# Patient Record
Sex: MALE | Race: WHITE | NOT HISPANIC OR LATINO | Employment: OTHER | URBAN - METROPOLITAN AREA
[De-identification: names, ages, dates, MRNs, and addresses within clinical notes are randomized per-mention and may not be internally consistent; named-entity substitution may affect disease eponyms.]

---

## 2017-04-18 ENCOUNTER — TELEPHONE (OUTPATIENT)
Dept: PULMONOLOGY | Facility: HOSPICE | Age: 67
End: 2017-04-18

## 2017-04-18 DIAGNOSIS — G47.33 OBSTRUCTIVE SLEEP APNEA: ICD-10-CM

## 2017-05-25 VITALS
BODY MASS INDEX: 44.57 KG/M2 | HEIGHT: 60 IN | DIASTOLIC BLOOD PRESSURE: 62 MMHG | TEMPERATURE: 97.9 F | SYSTOLIC BLOOD PRESSURE: 120 MMHG | WEIGHT: 227 LBS | HEART RATE: 70 BPM | RESPIRATION RATE: 16 BRPM

## 2017-05-25 RX ORDER — HYDROCHLOROTHIAZIDE 12.5 MG/1
12.5 TABLET ORAL DAILY
COMMUNITY
End: 2018-05-11

## 2017-05-25 RX ORDER — AMLODIPINE BESYLATE 5 MG/1
5 TABLET ORAL DAILY
COMMUNITY
End: 2018-05-11

## 2017-05-25 RX ORDER — TRAZODONE HYDROCHLORIDE 50 MG/1
50-100 TABLET ORAL NIGHTLY PRN
COMMUNITY
End: 2017-06-14

## 2017-06-14 ENCOUNTER — SLEEP CENTER VISIT (OUTPATIENT)
Dept: SLEEP MEDICINE | Facility: MEDICAL CENTER | Age: 67
End: 2017-06-14
Payer: COMMERCIAL

## 2017-06-14 VITALS
RESPIRATION RATE: 16 BRPM | SYSTOLIC BLOOD PRESSURE: 112 MMHG | HEART RATE: 69 BPM | WEIGHT: 227 LBS | TEMPERATURE: 97.7 F | BODY MASS INDEX: 33.62 KG/M2 | OXYGEN SATURATION: 92 % | DIASTOLIC BLOOD PRESSURE: 70 MMHG | HEIGHT: 69 IN

## 2017-06-14 DIAGNOSIS — G47.00 INSOMNIA, UNSPECIFIED TYPE: ICD-10-CM

## 2017-06-14 DIAGNOSIS — G47.33 OSA (OBSTRUCTIVE SLEEP APNEA): ICD-10-CM

## 2017-06-14 PROCEDURE — 99213 OFFICE O/P EST LOW 20 MIN: CPT | Performed by: NURSE PRACTITIONER

## 2017-06-14 RX ORDER — IRBESARTAN 300 MG/1
TABLET ORAL
COMMUNITY
Start: 2017-05-27 | End: 2018-05-11

## 2017-06-14 RX ORDER — TRIAMTERENE AND HYDROCHLOROTHIAZIDE 37.5; 25 MG/1; MG/1
TABLET ORAL
COMMUNITY
Start: 2017-04-10 | End: 2018-05-11

## 2017-06-14 RX ORDER — TRAZODONE HYDROCHLORIDE 100 MG/1
100 TABLET ORAL NIGHTLY PRN
Qty: 30 TAB | Refills: 3 | Status: SHIPPED
Start: 2017-06-14 | End: 2017-09-08 | Stop reason: SDUPTHER

## 2017-06-14 NOTE — PATIENT INSTRUCTIONS
1) Continue CPAP at 8cmH20  2) Clean mask and supplies weekly and change them as insurance allows  3) Vaccines: Up to date Pneumovax 23  4) Return in about 1 year (around 6/14/2018) for Compliance, review of symptoms, if not sooner, follow up with MARISSA Caban.

## 2017-06-14 NOTE — PROGRESS NOTES
CC:  Here for f/u sleep issues as listed below    HPI:   Channing presents today for follow up obstructive sleep apnea. He is [employer]. PMH of HTN, Autoimmune hepatisis, pacer for second degree AV block. PSG from 1/2016 indicated an AHI of 51 and low oxygen of 80%.  Currently he is being treated with CPAP @ 8cmH20.  Compliance download from the dates 5/15/2017 - 6/13/2017 indicates he is wearing the device 100% for an avg of 8 hours and 23 minutes per night with a reduced AHI of 0.7.  He does tolerate pressure and mask well.  He wakes up refreshed and is less tired throughout the day. They deny morning H/A. He sleeps better overall injury and more frequently now on treatment. He tried Trazodone for insomnia a few occasions with benefit. He will continue to clean supplies weekly and change them as insurance allows.       Patient Active Problem List    Diagnosis Date Noted   • Insomnia 06/14/2017   • PRINCESS (obstructive sleep apnea) 06/14/2017   • CKD (chronic kidney disease) stage 3, GFR 30-59 ml/min 08/24/2016   • Right knee pain 04/29/2016   • Atrioventricular block 12/05/2013   • Renal insufficiency 08/01/2011   • HTN (hypertension) 05/24/2011       Past Medical History   Diagnosis Date   • Hepatitis C    • Jaundice    • Sleep apnea    • Snoring    • Arthritis      knee/fingers bilat   • Hypertension        Past Surgical History   Procedure Laterality Date   • Recovery  12/5/2013     Performed by Cath-Recovery Surgery at SURGERY SAME DAY [facility]   • Appendectomy  1996   • Pacemaker insertion  2013   • Knee arthroscopy Right 4/29/2016     Procedure: KNEE ARTHROSCOPY;  Surgeon: Yesenia Grigsby M.D.;  Location: [facility];  Service:    • Chondroplasty Right 4/29/2016     Procedure: CHONDROPLASTY;  Surgeon: Yesenia Grigsby M.D.;  Location: [facility];  Service:    • Meniscectomy Right 4/29/2016     Procedure: MENISCECTOMY - PARTIAL LATERAL;  Surgeon: Yesenia  SHEA Grigsby;  Location: SURGERY [facility];  Service:        Family History   Problem Relation Age of Onset   • Heart Disease Father    • Breast Cancer Mother    • Cancer Mother        Social History     Social History   • Marital Status:      Spouse Name: N/A   • Number of Children: N/A   • Years of Education: N/A     Occupational History   • Not on file.     Social History Main Topics   • Smoking status: Former Smoker -- 1.50 packs/day for 35 years     Types: Cigarettes     Quit date: 04/25/1981   • Smokeless tobacco: Never Used   • Alcohol Use: 0.0 oz/week     0 Standard drinks or equivalent per week      Comment: 2 per month   • Drug Use: No   • Sexual Activity: Not on file     Other Topics Concern   • Not on file     Social History Narrative       Current Outpatient Prescriptions   Medication Sig Dispense Refill   • irbesartan (AVAPRO) 300 MG Tab      • triamterene-hctz (MAXZIDE-25/DYAZIDE) 37.5-25 MG Tab      • trazodone (DESYREL) 100 MG Tab Take 1 Tab by mouth at bedtime as needed for Sleep. 30 Tab 3   • carvedilol (COREG) 6.25 MG Tab Take 6.25 mg by mouth 2 times a day, with meals.     • calcium carbonate (TUMS) 500 MG Chew Tab Take 500 mg by mouth every day.     • hydrochlorothiazide (HYDRODIURIL) 12.5 MG tablet Take 12.5 mg by mouth every day.     • amlodipine (NORVASC) 5 MG Tab Take 5 mg by mouth every day.     • triamterene/hctz (DYAZIDE) 37.5-25 MG Cap Take 1 Cap by mouth every morning.     • irbesartan (AVAPRO) 150 MG Tab Take 150 mg by mouth every evening.       No current facility-administered medications for this visit.          Allergies: Ancef      ROS   Gen: Denies fever, chills, unintentional weight loss, fatigue  Resp:Denies Dyspnea  CV: Denies chest pain, chest tightness  Sleep:Denies morning headache, insomnia, daytime somnolence, snoring, gasping for air, apnea  Neuro: Denies frequent headaches, weakness, dizziness  See HPI.  All other systems reviewed and  "negative        Vital signs for this encounter:  Filed Vitals:    06/14/17 0817   Height: 1.753 m (5' 9\")   Weight: 102.967 kg (227 lb)   Weight % change since last entry.: 0 %   BP: 112/70   Pulse: 69   BMI (Calculated): 33.52   Resp: 16   Temp: 36.5 °C (97.7 °F)   O2 sat % room air: 92 %                   Physical Exam:   Gen:         Alert and oriented, No apparent distress.   Neck:        No Lymphadenopathy.  Lungs:     Clear to auscultation bilaterally.    CV:          Regular rate and rhythm. No murmurs, rubs or gallops.   Abd:         Soft non tender, non distended.            Ext:          No clubbing, cyanosis, edema.    Assessment   1. Insomnia, unspecified type  trazodone (DESYREL) 100 MG Tab    DME MASK AND SUPPLIES   2. PRINCESS (obstructive sleep apnea)         PLAN:   Patient Instructions   1) Continue CPAP at 8cmH20  2) Clean mask and supplies weekly and change them as insurance allows  3) Vaccines: Up to date Pneumovax 23  4) Return in about 1 year (around 6/14/2018) for Compliance, review of symptoms, if not sooner, follow up with MARISSA Caban.  5) Refill on Trazodone          "

## 2017-06-14 NOTE — MR AVS SNAPSHOT
"        Channing Grande   2017 8:20 AM   Sleep Center Visit   MRN: [id number]    Department:  [facility]   Dept Phone:  [phone]    Description:  Male : 1950   Provider:  JAMIN Canada           Reason for Visit     Apnea Last seen 3/9/16      Allergies as of 2017     Allergen Noted Reactions    Ancef [Cefazolin Sodium] 2013       N/V    Per patient No know allergies       You were diagnosed with     Insomnia, unspecified type   [3031563]       PRINCESS (obstructive sleep apnea)   [033278]         Vital Signs     Blood Pressure Pulse Temperature Respirations Height Weight    112/70 mmHg 69 36.5 °C (97.7 °F) 16 1.753 m (5' 9\") 102.967 kg (227 lb)    Body Mass Index Oxygen Saturation Smoking Status             33.51 kg/m2 92% Former Smoker         Basic Information     Date Of Birth Sex Race Ethnicity Preferred Language    1950 Male White Non- English      Problem List              ICD-10-CM Priority Class Noted - Resolved    HTN (hypertension) I10   2011 - Present    Renal insufficiency N28.9   2011 - Present    Atrioventricular block I44.30   2013 - Present    Right knee pain M25.561   2016 - Present    CKD (chronic kidney disease) stage 3, GFR 30-59 ml/min N18.3   2016 - Present    Insomnia G47.00   2017 - Present    PRINCESS (obstructive sleep apnea) G47.33   2017 - Present      Health Maintenance        Date Due Completion Dates    IMM DTaP/Tdap/Td Vaccine (1 - Tdap) 1967 ---    COLONOSCOPY 1998 ---    IMM ZOSTER VACCINE 2008 ---    IMM PNEUMOCOCCAL 65+ (ADULT) LOW/MEDIUM RISK SERIES (2 of 2 - PCV13) 2014            Current Immunizations     Influenza TIV (IM) 2013 10:47 AM    Pneumococcal polysaccharide vaccine (PPSV-23) 2013 10:47 AM      Below and/or attached are the medications your provider expects you to take. Review all of your home medications and newly ordered " medications with your provider and/or pharmacist. Follow medication instructions as directed by your provider and/or pharmacist. Please keep your medication list with you and share with your provider. Update the information when medications are discontinued, doses are changed, or new medications (including over-the-counter products) are added; and carry medication information at all times in the event of emergency situations     Allergies:  ANCEF - (reactions not documented)               Medications  Valid as of: June 14, 2017 -  9:04 AM    Generic Name Brand Name Tablet Size Instructions for use    AmLODIPine Besylate (Tab) NORVASC 5 MG Take 5 mg by mouth every day.        Calcium Carbonate Antacid (Chew Tab) TUMS 500 MG Take 500 mg by mouth every day.        Carvedilol (Tab) COREG 6.25 MG Take 6.25 mg by mouth 2 times a day, with meals.        HydroCHLOROthiazide (Tab) HYDRODIURIL 12.5 MG Take 12.5 mg by mouth every day.        Irbesartan (Tab) AVAPRO 150 MG Take 150 mg by mouth every evening.        Irbesartan (Tab) AVAPRO 300 MG         TraZODone HCl (Tab) DESYREL 100 MG Take 1 Tab by mouth at bedtime as needed for Sleep.        Triamterene-HCTZ (Cap) MAXZIDE-25/DYAZIDE 37.5-25 MG Take 1 Cap by mouth every morning.        Triamterene-HCTZ (Tab) MAXZIDE-25/DYAZIDE 37.5-25 MG         .                 Medicines prescribed today were sent to:     [id number] - [place], [place] - [address]    [address] [facility] [place] [address]    Phone: [phone] Fax: [phone]    Open 24 Hours?: No    [facility]    [address] [place] [address]    Phone: [phone] Fax: [phone]      Medication refill instructions:       If your prescription bottle indicates you have medication refills left, it is not necessary to call your provider’s office. Please contact your pharmacy and they will refill your medication.    If your prescription bottle indicates you do not have any refills left, you  may request refills at any time through one of the following ways: The online [employer] system (except Urgent Care), by calling your provider’s office, or by asking your pharmacy to contact your provider’s office with a refill request. Medication refills are processed only during regular business hours and may not be available until the next business day. Your provider may request additional information or to have a follow-up visit with you prior to refilling your medication.   *Please Note: Medication refills are assigned a new Rx number when refilled electronically. Your pharmacy may indicate that no refills were authorized even though a new prescription for the same medication is available at the pharmacy. Please request the medicine by name with the pharmacy before contacting your provider for a refill.        Instructions    1) Continue CPAP at 8cmH20  2) Clean mask and supplies weekly and change them as insurance allows  3) Vaccines: Up to date Pneumovax 23  4) Return in about 1 year (around 6/14/2018) for Compliance, review of symptoms, if not sooner, follow up with MARISSA Caban.              [employer] Access Code: [email or web address]  Expires: 7/14/2017  8:12 AM    [employer]  A secure, online tool to manage your health information     [employer]’s [employer]® is a secure, online tool that connects you to your personalized health information from the privacy of your home -- day or night - making it very easy for you to manage your healthcare. Once the activation process is completed, you can even access your medical information using the [facility] nat, which is available for free in the Apple Nat store or Google Play store.     [employer] provides the following levels of access (as shown below):   My Chart Features   [facility] Doctor [facility]  Specialists [facility]  Urgent  Care Non-[facility]  Primary Care  Doctor   Email your healthcare team securely and privately 24/7 X X X    Manage appointments: schedule  your next appointment; view details of past/upcoming appointments X      Request prescription refills. X      View recent personal medical records, including lab and immunizations X X X X   View health record, including health history, allergies, medications X X X X   Read reports about your outpatient visits, procedures, consult and ER notes X X X X   See your discharge summary, which is a recap of your hospital and/or ER visit that includes your diagnosis, lab results, and care plan. X X       How to register for [employer]:  1. Go to  [email or web address].  2. Click on the Sign Up Now box, which takes you to the New Member Sign Up page. You will need to provide the following information:  a. Enter your [employer] Access Code exactly as it appears at the top of this page. (You will not need to use this code after you’ve completed the sign-up process. If you do not sign up before the expiration date, you must request a new code.)   b. Enter your date of birth.   c. Enter your home email address.   d. Click Submit, and follow the next screen’s instructions.  3. Create a [employer] ID. This will be your [employer] login ID and cannot be changed, so think of one that is secure and easy to remember.  4. Create a [employer] password. You can change your password at any time.  5. Enter your Password Reset Question and Answer. This can be used at a later time if you forget your password.   6. Enter your e-mail address. This allows you to receive e-mail notifications when new information is available in [employer].  7. Click Sign Up. You can now view your health information.    For assistance activating your [employer] account, call [phone]

## 2017-09-08 DIAGNOSIS — G47.00 INSOMNIA, UNSPECIFIED TYPE: ICD-10-CM

## 2017-09-08 RX ORDER — TRAZODONE HYDROCHLORIDE 100 MG/1
100 TABLET ORAL NIGHTLY PRN
Qty: 30 TAB | Refills: 3 | Status: SHIPPED
Start: 2017-09-08 | End: 2019-04-25

## 2017-09-08 NOTE — TELEPHONE ENCOUNTER
Caller Name: Channing Grande                 Call Back Number: [phone] (home)         Patient approves a detailed voicemail message: yes    Have we ever prescribed this med? Yes.  If yes, what date? 6/14/17    Last OV: 6/14/17    Next OV: 1 yr return    DX: Insomnia    Medications: Trazodone HCL  Current Outpatient Prescriptions   Medication Sig Dispense Refill   • irbesartan (AVAPRO) 300 MG Tab      • triamterene-hctz (MAXZIDE-25/DYAZIDE) 37.5-25 MG Tab      • trazodone (DESYREL) 100 MG Tab Take 1 Tab by mouth at bedtime as needed for Sleep. 30 Tab 3   • hydrochlorothiazide (HYDRODIURIL) 12.5 MG tablet Take 12.5 mg by mouth every day.     • amlodipine (NORVASC) 5 MG Tab Take 5 mg by mouth every day.     • triamterene/hctz (DYAZIDE) 37.5-25 MG Cap Take 1 Cap by mouth every morning.     • irbesartan (AVAPRO) 150 MG Tab Take 150 mg by mouth every evening.     • carvedilol (COREG) 6.25 MG Tab Take 6.25 mg by mouth 2 times a day, with meals.     • calcium carbonate (TUMS) 500 MG Chew Tab Take 500 mg by mouth every day.       No current facility-administered medications for this visit.        Caitlyn Betancourt also attached to message for she saw the pt last.

## 2017-09-11 NOTE — TELEPHONE ENCOUNTER
RX Faxed     [facility]  [id number] [id number]    P: [phone]  F: [phone]    Confirmation Received

## 2018-05-07 ENCOUNTER — TELEPHONE (OUTPATIENT)
Dept: CARDIOLOGY | Facility: MEDICAL CENTER | Age: 68
End: 2018-05-07

## 2018-05-07 NOTE — TELEPHONE ENCOUNTER
Spoke to patient regarding past medical records for NP appt. Patient reports being seen by Dr. Thomas 6 months ago w/ Pacemaker check 1 month ago had an EKG 3 years ago. Records received (clinical notes, pacemaker reading, labs) and sent to scan.

## 2018-05-11 ENCOUNTER — OFFICE VISIT (OUTPATIENT)
Dept: CARDIOLOGY | Facility: MEDICAL CENTER | Age: 68
End: 2018-05-11
Payer: COMMERCIAL

## 2018-05-11 VITALS
BODY MASS INDEX: 33.77 KG/M2 | HEART RATE: 68 BPM | SYSTOLIC BLOOD PRESSURE: 112 MMHG | OXYGEN SATURATION: 94 % | WEIGHT: 228 LBS | HEIGHT: 69 IN | DIASTOLIC BLOOD PRESSURE: 60 MMHG

## 2018-05-11 DIAGNOSIS — G47.33 OSA (OBSTRUCTIVE SLEEP APNEA): ICD-10-CM

## 2018-05-11 DIAGNOSIS — R06.02 SOB (SHORTNESS OF BREATH): ICD-10-CM

## 2018-05-11 DIAGNOSIS — I10 ESSENTIAL HYPERTENSION: ICD-10-CM

## 2018-05-11 LAB — EKG IMPRESSION: NORMAL

## 2018-05-11 PROCEDURE — 99204 OFFICE O/P NEW MOD 45 MIN: CPT | Mod: 25 | Performed by: INTERNAL MEDICINE

## 2018-05-11 PROCEDURE — 93000 ELECTROCARDIOGRAM COMPLETE: CPT | Performed by: INTERNAL MEDICINE

## 2018-05-11 ASSESSMENT — ENCOUNTER SYMPTOMS
DEPRESSION: 0
FALLS: 0
ORTHOPNEA: 0
PND: 0
DIZZINESS: 0
PALPITATIONS: 0
LOSS OF CONSCIOUSNESS: 0
ABDOMINAL PAIN: 0
SHORTNESS OF BREATH: 1

## 2018-05-11 NOTE — PROGRESS NOTES
"Chief Complaint   Patient presents with   • Shortness of Breath       Subjective:   Channing Grande is a 67 y.o. male referred to cardiology clinic for management of dyspnea.    Pertinent history  History of sick sinus syndrome status post permanent pacemaker.  Obstructive sleep apnea on CPAP.  Hyperlipidemia. Intolerant to statins. Developed hepatitis while on statins about 20 years ago.  Hypertension  CKD    Patient was in his usual state of health until about last week ago when he suddenly started having dyspnea which he describes as \"not having enough air\". Symptoms would occur almost daily without any clear triggers. Sometimes even with resting. Mostly with walking. He does not have any history of asthma or COPD. Reports that in the past 2 years he's had some allergy issues. Symptoms have spontaneously improved in the past 3 days. No associated chest discomfort.    Reports that his blood pressure is usually well controlled in the 120s/60s range.  He is currently on CPAP and has been compliant with it.    Pacemaker was last interrogated in March 2018 which showed atrial pacing 25%, 27% ventricular pacing. No evidence of atrial fibrillation. At the last interrogation, patient was noted to have less than 1% burden of atrial fibrillation, longest run of about 20 seconds.    Past Medical History:   Diagnosis Date   • Arthritis     knee/fingers bilat   • Hepatitis C    • Hypertension    • Jaundice    • Sleep apnea    • Snoring      Past Surgical History:   Procedure Laterality Date   • KNEE ARTHROSCOPY Right 4/29/2016    Procedure: KNEE ARTHROSCOPY;  Surgeon: Yesenia Grigsby M.D.;  Location: [facility];  Service:    • CHONDROPLASTY Right 4/29/2016    Procedure: CHONDROPLASTY;  Surgeon: Yesenia Grigsby M.D.;  Location: [facility];  Service:    • MENISCECTOMY Right 4/29/2016    Procedure: MENISCECTOMY - PARTIAL LATERAL;  Surgeon: Yesenia Grigsby M.D.;  Location: [facility]" GERMAINE ORS;  Service:    • RECOVERY  12/5/2013    Performed by Cath-Recovery Surgery at SURGERY SAME DAY [facility] ORS   • PACEMAKER INSERTION  2013   • APPENDECTOMY  1996     Family History   Problem Relation Age of Onset   • Heart Disease Father    • Breast Cancer Mother    • Cancer Mother      Social History     Social History   • Marital status:      Spouse name: N/A   • Number of children: N/A   • Years of education: N/A     Occupational History   • Not on file.     Social History Main Topics   • Smoking status: Former Smoker     Packs/day: 1.50     Years: 35.00     Types: Cigarettes     Quit date: 4/25/1981   • Smokeless tobacco: Never Used   • Alcohol use No   • Drug use: No   • Sexual activity: Not on file     Other Topics Concern   • Not on file     Social History Narrative   • No narrative on file     Allergies   Allergen Reactions   • Ancef [Cefazolin Sodium]      N/V    Per patient No know allergies      Outpatient Encounter Prescriptions as of 5/11/2018   Medication Sig Dispense Refill   • trazodone (DESYREL) 100 MG Tab Take 1 Tab by mouth at bedtime as needed for Sleep. 30 Tab 3   • triamterene/hctz (DYAZIDE) 37.5-25 MG Cap Take 1 Cap by mouth every morning.     • irbesartan (AVAPRO) 150 MG Tab Take 150 mg by mouth 2 Times a Day.     • carvedilol (COREG) 6.25 MG Tab Take 6.25 mg by mouth 2 times a day, with meals.     • [DISCONTINUED] irbesartan (AVAPRO) 300 MG Tab      • [DISCONTINUED] triamterene-hctz (MAXZIDE-25/DYAZIDE) 37.5-25 MG Tab      • [DISCONTINUED] hydrochlorothiazide (HYDRODIURIL) 12.5 MG tablet Take 12.5 mg by mouth every day.     • [DISCONTINUED] amlodipine (NORVASC) 5 MG Tab Take 5 mg by mouth every day.     • calcium carbonate (TUMS) 500 MG Chew Tab Take 500 mg by mouth every day.       No facility-administered encounter medications on file as of 5/11/2018.      Review of Systems   Constitutional: Negative for malaise/fatigue.   Respiratory: Positive for shortness of breath.   "  Cardiovascular: Negative for chest pain, palpitations, orthopnea, leg swelling and PND.   Gastrointestinal: Negative for abdominal pain.   Musculoskeletal: Negative for falls.   Neurological: Negative for dizziness and loss of consciousness.   Psychiatric/Behavioral: Negative for depression.   All other systems reviewed and are negative.       Objective:   /60   Pulse 68   Ht 1.753 m (5' 9\")   Wt 103.4 kg (228 lb)   SpO2 94%   BMI 33.67 kg/m²     Physical Exam   Constitutional: He is oriented to person, place, and time. He appears well-developed and well-nourished. No distress.   HENT:   Head: Normocephalic and atraumatic.   Eyes: Conjunctivae are normal.   Neck: Normal range of motion. Neck supple.   Cardiovascular: Normal rate, regular rhythm and normal heart sounds.  Exam reveals no gallop and no friction rub.    No murmur heard.  Pulmonary/Chest: Effort normal and breath sounds normal. No respiratory distress. He has no wheezes. He has no rales.   Abdominal: Soft. He exhibits no distension. There is no tenderness.   Musculoskeletal: He exhibits no edema.   Neurological: He is alert and oriented to person, place, and time.   Skin: Skin is warm and dry. He is not diaphoretic.   Psychiatric: He has a normal mood and affect. His behavior is normal.   Nursing note and vitals reviewed.    EKG performed today was personally reviewed and showed normal sinus rhythm at 62 beats per minute. First degree AV block, right bundle-branch lock.    Assessment:     1. SOB (shortness of breath)  EKG    ECHOCARDIOGRAM COMP W/O CONT    CBC WITHOUT DIFFERENTIAL   2. PRINCESS (obstructive sleep apnea)     3. Essential hypertension  BASIC METABOLIC PANEL    LIPID PROFILE       Medical Decision Making:  Today's Assessment / Status / Plan:     Dyspnea: Atypical symptoms. Possibly related to allergies. I will refer him for an echocardiogram to evaluate his LV function. He is euvolemic on exam.  Basic labs have been ordered as " well.    Hypertension: Blood pressure at goal. Continue irbesartan, triamterene, hydrochlorothiazide and carvedilol at current dose. Chem-7 was ordered today.    Obstructive sleep apnea: Patient is compliant with his CPAP.    Return to clinic in 2 months or earlier if needed.    Thank you for allowing me to participate in the care of this patient. Please do not hesitate to contact me with any questions.    Jen Roberson MD  Cardiologist  [facility]      PLEASE NOTE: This dictation was created using voice recognition software.

## 2018-05-11 NOTE — LETTER
"     [facility]   [address], [address]  [place] [address]  Phone: [phone]  Fax: [phone]              Channing Grande  1950    Encounter Date: 5/11/2018    Jen Roberson M.D.          PROGRESS NOTE:  Chief Complaint   Patient presents with   • Shortness of Breath       Subjective:   Channing Grande is a 67 y.o. male referred to cardiology clinic for management of dyspnea.    Pertinent history  History of sick sinus syndrome status post permanent pacemaker.  Obstructive sleep apnea on CPAP.  Hyperlipidemia. Intolerant to statins. Developed hepatitis while on statins about 20 years ago.  Hypertension  CKD    Patient was in his usual state of health until about last week ago when he suddenly started having dyspnea which he describes as \"not having enough air\". Symptoms would occur almost daily without any clear triggers. Sometimes even with resting. Mostly with walking. He does not have any history of asthma or COPD. Reports that in the past 2 years he's had some allergy issues. Symptoms have spontaneously improved in the past 3 days. No associated chest discomfort.    Reports that his blood pressure is usually well controlled in the 120s/60s range.  He is currently on CPAP and has been compliant with it.    Pacemaker was last interrogated in March 2018 which showed atrial pacing 25%, 27% ventricular pacing. No evidence of atrial fibrillation. At the last interrogation, patient was noted to have less than 1% burden of atrial fibrillation, longest run of about 20 seconds.    Past Medical History:   Diagnosis Date   • Arthritis     knee/fingers bilat   • Hepatitis C    • Hypertension    • Jaundice    • Sleep apnea    • Snoring      Past Surgical History:   Procedure Laterality Date   • KNEE ARTHROSCOPY Right 4/29/2016    Procedure: KNEE ARTHROSCOPY;  Surgeon: Yesenia Grigsby M.D.;  Location: SURGERY [facility];  Service:    • CHONDROPLASTY " Right 4/29/2016    Procedure: CHONDROPLASTY;  Surgeon: Yesenia Grigsby M.D.;  Location: SURGERY [facility];  Service:    • MENISCECTOMY Right 4/29/2016    Procedure: MENISCECTOMY - PARTIAL LATERAL;  Surgeon: Yesenia Grigsby M.D.;  Location: SURGERY [facility];  Service:    • RECOVERY  12/5/2013    Performed by Cath-Recovery Surgery at SURGERY SAME DAY [facility]S   • PACEMAKER INSERTION  2013   • APPENDECTOMY  1996     Family History   Problem Relation Age of Onset   • Heart Disease Father    • Breast Cancer Mother    • Cancer Mother      Social History     Social History   • Marital status:      Spouse name: N/A   • Number of children: N/A   • Years of education: N/A     Occupational History   • Not on file.     Social History Main Topics   • Smoking status: Former Smoker     Packs/day: 1.50     Years: 35.00     Types: Cigarettes     Quit date: 4/25/1981   • Smokeless tobacco: Never Used   • Alcohol use No   • Drug use: No   • Sexual activity: Not on file     Other Topics Concern   • Not on file     Social History Narrative   • No narrative on file     Allergies   Allergen Reactions   • Ancef [Cefazolin Sodium]      N/V    Per patient No know allergies      Outpatient Encounter Prescriptions as of 5/11/2018   Medication Sig Dispense Refill   • trazodone (DESYREL) 100 MG Tab Take 1 Tab by mouth at bedtime as needed for Sleep. 30 Tab 3   • triamterene/hctz (DYAZIDE) 37.5-25 MG Cap Take 1 Cap by mouth every morning.     • irbesartan (AVAPRO) 150 MG Tab Take 150 mg by mouth 2 Times a Day.     • carvedilol (COREG) 6.25 MG Tab Take 6.25 mg by mouth 2 times a day, with meals.     • [DISCONTINUED] irbesartan (AVAPRO) 300 MG Tab      • [DISCONTINUED] triamterene-hctz (MAXZIDE-25/DYAZIDE) 37.5-25 MG Tab      • [DISCONTINUED] hydrochlorothiazide (HYDRODIURIL) 12.5 MG tablet Take 12.5 mg by mouth every day.     • [DISCONTINUED] amlodipine (NORVASC) 5 MG Tab Take 5 mg by mouth every day.     • calcium  "carbonate (TUMS) 500 MG Chew Tab Take 500 mg by mouth every day.       No facility-administered encounter medications on file as of 5/11/2018.      Review of Systems   Constitutional: Negative for malaise/fatigue.   Respiratory: Positive for shortness of breath.    Cardiovascular: Negative for chest pain, palpitations, orthopnea, leg swelling and PND.   Gastrointestinal: Negative for abdominal pain.   Musculoskeletal: Negative for falls.   Neurological: Negative for dizziness and loss of consciousness.   Psychiatric/Behavioral: Negative for depression.   All other systems reviewed and are negative.       Objective:   /60   Pulse 68   Ht 1.753 m (5' 9\")   Wt 103.4 kg (228 lb)   SpO2 94%   BMI 33.67 kg/m²      Physical Exam   Constitutional: He is oriented to person, place, and time. He appears well-developed and well-nourished. No distress.   HENT:   Head: Normocephalic and atraumatic.   Eyes: Conjunctivae are normal.   Neck: Normal range of motion. Neck supple.   Cardiovascular: Normal rate, regular rhythm and normal heart sounds.  Exam reveals no gallop and no friction rub.    No murmur heard.  Pulmonary/Chest: Effort normal and breath sounds normal. No respiratory distress. He has no wheezes. He has no rales.   Abdominal: Soft. He exhibits no distension. There is no tenderness.   Musculoskeletal: He exhibits no edema.   Neurological: He is alert and oriented to person, place, and time.   Skin: Skin is warm and dry. He is not diaphoretic.   Psychiatric: He has a normal mood and affect. His behavior is normal.   Nursing note and vitals reviewed.    EKG performed today was personally reviewed and showed normal sinus rhythm at 62 beats per minute. First degree AV block, right bundle-branch lock.    Assessment:     1. SOB (shortness of breath)  EKG    ECHOCARDIOGRAM COMP W/O CONT    CBC WITHOUT DIFFERENTIAL   2. PRINCESS (obstructive sleep apnea)     3. Essential hypertension  BASIC METABOLIC PANEL    LIPID " PROFILE       Medical Decision Making:  Today's Assessment / Status / Plan:     Dyspnea: Atypical symptoms. Possibly related to allergies. I will refer him for an echocardiogram to evaluate his LV function. He is euvolemic on exam.  Basic labs have been ordered as well.    Hypertension: Blood pressure at goal. Continue irbesartan, triamterene, hydrochlorothiazide and carvedilol at current dose. Chem-7 was ordered today.    Obstructive sleep apnea: Patient is compliant with his CPAP.    Return to clinic in 2 months or earlier if needed.    Thank you for allowing me to participate in the care of this patient. Please do not hesitate to contact me with any questions.    Jen Roberson MD  Cardiologist  [facility]      PLEASE NOTE: This dictation was created using voice recognition software.             Yannick Rodríguez M.D.  [address] [place] [address] [address]  [address]  [place] [id number]  VIA Facsimile: [phone]

## 2018-05-24 ENCOUNTER — TELEPHONE (OUTPATIENT)
Dept: SLEEP MEDICINE | Facility: MEDICAL CENTER | Age: 68
End: 2018-05-24

## 2018-05-24 DIAGNOSIS — G47.33 OSA (OBSTRUCTIVE SLEEP APNEA): ICD-10-CM

## 2018-05-24 NOTE — TELEPHONE ENCOUNTER
Order, demo, last oV faxed to DME:  [facility]  / lauren [phone] / fax [phone]     Left message informed order faxed to [facility].

## 2018-05-25 ENCOUNTER — HOSPITAL ENCOUNTER (OUTPATIENT)
Dept: CARDIOLOGY | Facility: MEDICAL CENTER | Age: 68
End: 2018-05-25
Attending: INTERNAL MEDICINE
Payer: COMMERCIAL

## 2018-05-25 DIAGNOSIS — R06.02 SOB (SHORTNESS OF BREATH): ICD-10-CM

## 2018-05-25 LAB
LV EJECT FRACT  99904: 65
LV EJECT FRACT MOD 2C 99903: 64.07
LV EJECT FRACT MOD 4C 99902: 67.22
LV EJECT FRACT MOD BP 99901: 64.99

## 2018-05-25 PROCEDURE — 93306 TTE W/DOPPLER COMPLETE: CPT

## 2018-05-25 PROCEDURE — 93306 TTE W/DOPPLER COMPLETE: CPT | Mod: 26 | Performed by: INTERNAL MEDICINE

## 2018-06-01 ENCOUNTER — TELEPHONE (OUTPATIENT)
Dept: CARDIOLOGY | Facility: MEDICAL CENTER | Age: 68
End: 2018-06-01

## 2018-06-01 NOTE — TELEPHONE ENCOUNTER
----- Message from Aarti Mckeon sent at 2018  8:33 AM PDT -----  Regardin/25 echo results  Contact: [phone]  NOELLE/katy Mayer calling for results of  echo, please call

## 2018-06-01 NOTE — TELEPHONE ENCOUNTER
"LM that Dr. Roberson is on vacation- but the \"test you were asking about\" looks good.  Call back next week if needed.  "

## 2018-06-25 ENCOUNTER — TELEPHONE (OUTPATIENT)
Dept: CARDIOLOGY | Facility: MEDICAL CENTER | Age: 68
End: 2018-06-25

## 2018-06-25 NOTE — TELEPHONE ENCOUNTER
S/w pt. He notes that his wife has been referred to cardiology by Dr. Rodríguez but they were unable to schedule her until August. Attempted to find sooner appointment with Dr. Roberson and educated pt that I will need to have scheduling try to assist on getting pt put on wait list. Reassured pt that if wife has any recurrent CP to please have her go to the ER to be evaluated. He states verbal understanding. Apologized to him that I am unable to find sooner appointment.     Message sent to scheduling to please assist in scheduling sooner NP appointment for wife. (MR#[id number])    ----- Message from Ibis Asher sent at 6/25/2018 10:26 AM PDT -----  Regarding: Patient is calling about his wife having chest issues  NOELLE/Nanette    Patient is calling about his wife, not himself. He said that his wife has been having chest issues and last night, she was short of breath. He wants to get her in to see Dr Da conroy and wants a call back at [phone].

## 2018-06-30 LAB
BUN SERPL-MCNC: 30 MG/DL (ref 8–27)
BUN/CREAT SERPL: 18 (ref 10–24)
CALCIUM SERPL-MCNC: 9.6 MG/DL (ref 8.6–10.2)
CHLORIDE SERPL-SCNC: 102 MMOL/L (ref 96–106)
CHOLEST SERPL-MCNC: 168 MG/DL (ref 100–199)
CO2 SERPL-SCNC: 21 MMOL/L (ref 20–29)
CREAT SERPL-MCNC: 1.66 MG/DL (ref 0.76–1.27)
ERYTHROCYTE [DISTWIDTH] IN BLOOD BY AUTOMATED COUNT: 13.7 % (ref 12.3–15.4)
GLUCOSE SERPL-MCNC: 106 MG/DL (ref 65–99)
HCT VFR BLD AUTO: 42 % (ref 37.5–51)
HDLC SERPL-MCNC: 32 MG/DL
HGB BLD-MCNC: 14.4 G/DL (ref 13–17.7)
IF AFRICAN AMERICAN  100797: 48 ML/MIN/1.73
IF NON AFRICAN AMER 100791: 41 ML/MIN/1.73
LABORATORY COMMENT REPORT: ABNORMAL
LDLC SERPL CALC-MCNC: 118 MG/DL (ref 0–99)
MCH RBC QN AUTO: 29.5 PG (ref 26.6–33)
MCHC RBC AUTO-ENTMCNC: 34.3 G/DL (ref 31.5–35.7)
MCV RBC AUTO: 86 FL (ref 79–97)
NRBC BLD AUTO-RTO: NORMAL %
PLATELET # BLD AUTO: 221 X10E3/UL (ref 150–379)
POTASSIUM SERPL-SCNC: 4.3 MMOL/L (ref 3.5–5.2)
RBC # BLD AUTO: 4.88 X10E6/UL (ref 4.14–5.8)
SODIUM SERPL-SCNC: 139 MMOL/L (ref 134–144)
TRIGL SERPL-MCNC: 90 MG/DL (ref 0–149)
VLDLC SERPL CALC-MCNC: 18 MG/DL (ref 5–40)
WBC # BLD AUTO: 6.4 X10E3/UL (ref 3.4–10.8)

## 2018-07-11 ENCOUNTER — TELEPHONE (OUTPATIENT)
Dept: CARDIOLOGY | Facility: MEDICAL CENTER | Age: 68
End: 2018-07-11

## 2018-07-11 NOTE — TELEPHONE ENCOUNTER
----- Message from Idalmis Alexandre R.N. sent at 7/11/2018 12:24 PM PDT -----      ----- Message -----  From: Jen Roberson M.D.  Sent: 7/11/2018   8:04 AM  To: Nanette Hernandez R.N.    Chem-7 reviewed. Looks good. No significant changes.   Thank you   AA

## 2018-07-11 NOTE — TELEPHONE ENCOUNTER
Patient informed.  He states his PCP, Dr. Dennis is very concerned about his declining HDL and his increased LDL and has ordered more labs.  He prescribed Zetia and the patient would like Dr. Roberson's opinion on taking Zetia.  He would like an answer today.  To NOELLE HERNANDEZ to please advise.

## 2018-07-13 NOTE — TELEPHONE ENCOUNTER
Message   Received: Today   Message Contents   SHEA uLcero R.N.   Caller: Unspecified (2 days ago,  1:38 PM)             Only mild elevation in LDL.  Dietary modification should be sufficient.   I do not think he needs Zetia.   His LDL should be reevaluated after 6 months of dietary modification and then treatment can be considered.      Attempted to contact patient, no answer. LVM to call back.

## 2018-07-18 NOTE — TELEPHONE ENCOUNTER
Attempted to call patient again. No answer, l/m for him to please call back to discuss Dr. Roberson's recommendations.

## 2018-07-19 NOTE — TELEPHONE ENCOUNTER
S/w pt. Educated him on Dr. Roberson's recommendations and he notes that he has a FU scheduled next week so he will discuss information with her then. He is very appreciative of follow up and denies further needs.

## 2018-07-27 ENCOUNTER — OFFICE VISIT (OUTPATIENT)
Dept: CARDIOLOGY | Facility: MEDICAL CENTER | Age: 68
End: 2018-07-27
Payer: COMMERCIAL

## 2018-07-27 VITALS
WEIGHT: 231 LBS | HEART RATE: 60 BPM | BODY MASS INDEX: 34.21 KG/M2 | DIASTOLIC BLOOD PRESSURE: 60 MMHG | OXYGEN SATURATION: 95 % | SYSTOLIC BLOOD PRESSURE: 120 MMHG | HEIGHT: 69 IN

## 2018-07-27 DIAGNOSIS — R06.02 SHORTNESS OF BREATH: ICD-10-CM

## 2018-07-27 DIAGNOSIS — R63.5 WEIGHT GAIN: ICD-10-CM

## 2018-07-27 DIAGNOSIS — E78.49 OTHER HYPERLIPIDEMIA: ICD-10-CM

## 2018-07-27 DIAGNOSIS — G47.33 OSA (OBSTRUCTIVE SLEEP APNEA): ICD-10-CM

## 2018-07-27 DIAGNOSIS — I10 ESSENTIAL HYPERTENSION: ICD-10-CM

## 2018-07-27 PROCEDURE — 99214 OFFICE O/P EST MOD 30 MIN: CPT | Performed by: INTERNAL MEDICINE

## 2018-07-27 ASSESSMENT — ENCOUNTER SYMPTOMS
SHORTNESS OF BREATH: 0
LOSS OF CONSCIOUSNESS: 0
PALPITATIONS: 0
ORTHOPNEA: 0
ABDOMINAL PAIN: 0
FALLS: 0
PND: 0
DEPRESSION: 0
DIZZINESS: 0

## 2018-07-27 NOTE — PROGRESS NOTES
Chief Complaint   Patient presents with   • Shortness of Breath     3 month follow up     Subjective:   Channing Grande is a 67 y.o. male presenting today for follow-up on his dyspnea.    Pertinent history  History of sick sinus syndrome status post permanent pacemaker.  Obstructive sleep apnea on CPAP.  Hyperlipidemia. Intolerant to statins. Developed hepatitis while on statins about 20 years ago.  Hypertension  CKD    Since his last visit, patient denies any recurrence of dyspnea.  His symptoms spontaneously resolved.  He has not been exercising much as he is very busy in his profession.  Mostly sedentary job.  He has tried to watch his diet but his weight loss is only transient.  His blood pressures have been mostly well controlled, like today.  He continues to be compliant with his CPAP.  His main question today is whether or not he needs Zetia for his hyperlipidemia.  He was started on Zetia about a week and a half ago by his primary care physician.    Reports that his device was interrogated 2 days ago at [facility] with normal device function.  He will bring the records for our review.  The last interrogation that I reviewed was in March 2018 which showed atrial pacing 25%, 27% ventricular pacing. No evidence of atrial fibrillation. At the last interrogation, patient was noted to have less than 1% burden of atrial fibrillation, longest run of about 20 seconds.    Past Medical History:   Diagnosis Date   • Arthritis     knee/fingers bilat   • Hepatitis C    • Hypertension    • Jaundice    • Sleep apnea    • Snoring      Past Surgical History:   Procedure Laterality Date   • KNEE ARTHROSCOPY Right 4/29/2016    Procedure: KNEE ARTHROSCOPY;  Surgeon: Yesenia Grigsby M.D.;  Location: SURGERY [facility];  Service:    • CHONDROPLASTY Right 4/29/2016    Procedure: CHONDROPLASTY;  Surgeon: Yesenia Grigsby M.D.;  Location: SURGERY [facility];  Service:    • MENISCECTOMY  Right 4/29/2016    Procedure: MENISCECTOMY - PARTIAL LATERAL;  Surgeon: Yesenia Grigsby M.D.;  Location: SURGERY [facility];  Service:    • RECOVERY  12/5/2013    Performed by Cath-Recovery Surgery at SURGERY SAME DAY [facility]   • PACEMAKER INSERTION  2013   • APPENDECTOMY  1996     Family History   Problem Relation Age of Onset   • Heart Disease Father    • Breast Cancer Mother    • Cancer Mother      Social History     Social History   • Marital status:      Spouse name: N/A   • Number of children: N/A   • Years of education: N/A     Occupational History   • Not on file.     Social History Main Topics   • Smoking status: Former Smoker     Packs/day: 1.50     Years: 35.00     Types: Cigarettes     Quit date: 4/25/1981   • Smokeless tobacco: Never Used   • Alcohol use No   • Drug use: No   • Sexual activity: Not on file     Other Topics Concern   • Not on file     Social History Narrative   • No narrative on file     Allergies   Allergen Reactions   • Ancef [Cefazolin Sodium]      N/V    Per patient No know allergies      Outpatient Encounter Prescriptions as of 7/27/2018   Medication Sig Dispense Refill   • trazodone (DESYREL) 100 MG Tab Take 1 Tab by mouth at bedtime as needed for Sleep. 30 Tab 3   • triamterene/hctz (DYAZIDE) 37.5-25 MG Cap Take 1 Cap by mouth every morning.     • irbesartan (AVAPRO) 150 MG Tab Take 150 mg by mouth 2 Times a Day.     • carvedilol (COREG) 6.25 MG Tab Take 6.25 mg by mouth 2 times a day, with meals.     • calcium carbonate (TUMS) 500 MG Chew Tab Take 500 mg by mouth every day.       No facility-administered encounter medications on file as of 7/27/2018.      Review of Systems   Constitutional: Negative for malaise/fatigue.   Respiratory: Negative for shortness of breath.    Cardiovascular: Negative for chest pain, palpitations, orthopnea, leg swelling and PND.   Gastrointestinal: Negative for abdominal pain.   Musculoskeletal: Negative for falls.   Neurological:  "Negative for dizziness and loss of consciousness.   Psychiatric/Behavioral: Negative for depression.   All other systems reviewed and are negative.       Objective:   /60   Pulse 60   Ht 1.753 m (5' 9\")   Wt 104.8 kg (231 lb)   SpO2 95%   BMI 34.11 kg/m²     Physical Exam   Constitutional: He is oriented to person, place, and time. He appears well-developed and well-nourished. No distress.   HENT:   Head: Normocephalic and atraumatic.   Eyes: Conjunctivae are normal.   Neck: Normal range of motion. Neck supple.   Cardiovascular: Normal rate, regular rhythm and normal heart sounds.  Exam reveals no gallop and no friction rub.    No murmur heard.  Pulmonary/Chest: Effort normal and breath sounds normal. No respiratory distress. He has no wheezes. He has no rales.   Abdominal: Soft. There is no tenderness.   Musculoskeletal: He exhibits no edema.   Neurological: He is alert and oriented to person, place, and time.   Skin: Skin is warm and dry. He is not diaphoretic.   Psychiatric: He has a normal mood and affect. His behavior is normal.   Nursing note and vitals reviewed.    Labs performed in June 2018 were reviewed and showed hemoglobin 14.4.  Creatinine 1.66.  Baseline creatinine around 1.5.  .  HDL 32.    Echocardiogram performed in May 2018.  Images were personally reviewed and showed a normal LV systolic function.  EF 65%.  Aortic valve sclerosis without stenosis.  RVSP could not be estimated due to inadequate TR jet.    Assessment:     1. Other hyperlipidemia     2. PRINCESS (obstructive sleep apnea)     3. Essential hypertension     4. Shortness of breath     5. Weight gain         Medical Decision Making:  Today's Assessment / Status / Plan:     Hyperlipidemia: Patient only has mild decrease in his HDL and a mild increase in his LDL.  He has never been tried on dietary modification.  For now I have discussed various dietary modifications with the patient along with aerobic exercise.  Would " recommend lifestyle modification for now and reevaluation of the lipid panel in about 6 months.  If lipids continue to be not at goal, further therapy can be considered.  Patient himself prefers to not be on any medications.  Therefore Zetia will be discontinued today.  Recommend a repeat lipid panel in 6 months.    Hypertension: Blood pressure is at goal.  Continue carvedilol, irbesartan, hydrochlorothiazide and triamterene at the current dose.  He has chronic kidney disease but his renal function has been mostly stable.    Dyspnea: Atypical symptoms.  Spontaneously resolved.  I suspect likely secondary to allergies.  Patient should continue to monitor symptoms and should he have recurrent symptoms, he should let us know.    Obstructive sleep apnea: Patient continues to be compliant with his CPAP.  Encouraged ongoing compliance.    Weight gain: Patient's main concern today is his weight.  We have discussed dietary modification.  He has also been encouraged to start exercising.  If he is unable to exercise he should at least try to walk more steps every day if possible.    Return to clinic in 6 months or earlier if needed.    Thank you for allowing me to participate in the care of this patient. Please do not hesitate to contact me with any questions.    Jen Roberson MD  Cardiologist  [facility]      PLEASE NOTE: This dictation was created using voice recognition software.

## 2018-07-27 NOTE — LETTER
[facility]   [address] [place] [address]  [place] [address]  Phone: [phone]  Fax: [phone]              Channing Grande  1950    Encounter Date: 7/27/2018    Jen Roberson M.D.          PROGRESS NOTE:  Chief Complaint   Patient presents with   • Shortness of Breath     3 month follow up     Subjective:   Channing Grande is a 67 y.o. male presenting today for follow-up on his dyspnea.    Pertinent history  History of sick sinus syndrome status post permanent pacemaker.  Obstructive sleep apnea on CPAP.  Hyperlipidemia. Intolerant to statins. Developed hepatitis while on statins about 20 years ago.  Hypertension  CKD    Since his last visit, patient denies any recurrence of dyspnea.  His symptoms spontaneously resolved.  He has not been exercising much as he is very busy in his profession.  Mostly sedentary job.  He has tried to watch his diet but his weight loss is only transient.  His blood pressures have been mostly well controlled, like today.  He continues to be compliant with his CPAP.  His main question today is whether or not he needs Zetia for his hyperlipidemia.  He was started on Zetia about a week and a half ago by his primary care physician.    Reports that his device was interrogated 2 days ago at [facility] with normal device function.  He will bring the records for our review.  The last interrogation that I reviewed was in March 2018 which showed atrial pacing 25%, 27% ventricular pacing. No evidence of atrial fibrillation. At the last interrogation, patient was noted to have less than 1% burden of atrial fibrillation, longest run of about 20 seconds.    Past Medical History:   Diagnosis Date   • Arthritis     knee/fingers bilat   • Hepatitis C    • Hypertension    • Jaundice    • Sleep apnea    • Snoring      Past Surgical History:   Procedure Laterality Date   • KNEE ARTHROSCOPY Right 4/29/2016    Procedure:  KNEE ARTHROSCOPY;  Surgeon: Yesenia Grigsby M.D.;  Location: SURGERY [facility];  Service:    • CHONDROPLASTY Right 4/29/2016    Procedure: CHONDROPLASTY;  Surgeon: Yesenia Grigsby M.D.;  Location: SURGERY [facility];  Service:    • MENISCECTOMY Right 4/29/2016    Procedure: MENISCECTOMY - PARTIAL LATERAL;  Surgeon: Yesenia Grigsby M.D.;  Location: SURGERY [facility];  Service:    • RECOVERY  12/5/2013    Performed by Cath-Recovery Surgery at SURGERY SAME DAY [facility] ORS   • PACEMAKER INSERTION  2013   • APPENDECTOMY  1996     Family History   Problem Relation Age of Onset   • Heart Disease Father    • Breast Cancer Mother    • Cancer Mother      Social History     Social History   • Marital status:      Spouse name: N/A   • Number of children: N/A   • Years of education: N/A     Occupational History   • Not on file.     Social History Main Topics   • Smoking status: Former Smoker     Packs/day: 1.50     Years: 35.00     Types: Cigarettes     Quit date: 4/25/1981   • Smokeless tobacco: Never Used   • Alcohol use No   • Drug use: No   • Sexual activity: Not on file     Other Topics Concern   • Not on file     Social History Narrative   • No narrative on file     Allergies   Allergen Reactions   • Ancef [Cefazolin Sodium]      N/V    Per patient No know allergies      Outpatient Encounter Prescriptions as of 7/27/2018   Medication Sig Dispense Refill   • trazodone (DESYREL) 100 MG Tab Take 1 Tab by mouth at bedtime as needed for Sleep. 30 Tab 3   • triamterene/hctz (DYAZIDE) 37.5-25 MG Cap Take 1 Cap by mouth every morning.     • irbesartan (AVAPRO) 150 MG Tab Take 150 mg by mouth 2 Times a Day.     • carvedilol (COREG) 6.25 MG Tab Take 6.25 mg by mouth 2 times a day, with meals.     • calcium carbonate (TUMS) 500 MG Chew Tab Take 500 mg by mouth every day.       No facility-administered encounter medications on file as of 7/27/2018.      Review of Systems   Constitutional: Negative  "for malaise/fatigue.   Respiratory: Negative for shortness of breath.    Cardiovascular: Negative for chest pain, palpitations, orthopnea, leg swelling and PND.   Gastrointestinal: Negative for abdominal pain.   Musculoskeletal: Negative for falls.   Neurological: Negative for dizziness and loss of consciousness.   Psychiatric/Behavioral: Negative for depression.   All other systems reviewed and are negative.       Objective:   /60   Pulse 60   Ht 1.753 m (5' 9\")   Wt 104.8 kg (231 lb)   SpO2 95%   BMI 34.11 kg/m²      Physical Exam   Constitutional: He is oriented to person, place, and time. He appears well-developed and well-nourished. No distress.   HENT:   Head: Normocephalic and atraumatic.   Eyes: Conjunctivae are normal.   Neck: Normal range of motion. Neck supple.   Cardiovascular: Normal rate, regular rhythm and normal heart sounds.  Exam reveals no gallop and no friction rub.    No murmur heard.  Pulmonary/Chest: Effort normal and breath sounds normal. No respiratory distress. He has no wheezes. He has no rales.   Abdominal: Soft. There is no tenderness.   Musculoskeletal: He exhibits no edema.   Neurological: He is alert and oriented to person, place, and time.   Skin: Skin is warm and dry. He is not diaphoretic.   Psychiatric: He has a normal mood and affect. His behavior is normal.   Nursing note and vitals reviewed.    Labs performed in June 2018 were reviewed and showed hemoglobin 14.4.  Creatinine 1.66.  Baseline creatinine around 1.5.  .  HDL 32.    Echocardiogram performed in May 2018.  Images were personally reviewed and showed a normal LV systolic function.  EF 65%.  Aortic valve sclerosis without stenosis.  RVSP could not be estimated due to inadequate TR jet.    Assessment:     1. Other hyperlipidemia     2. PRINCESS (obstructive sleep apnea)     3. Essential hypertension     4. Shortness of breath     5. Weight gain         Medical Decision Making:  Today's Assessment / Status / " Plan:     Hyperlipidemia: Patient only has mild decrease in his HDL and a mild increase in his LDL.  He has never been tried on dietary modification.  For now I have discussed various dietary modifications with the patient along with aerobic exercise.  Would recommend lifestyle modification for now and reevaluation of the lipid panel in about 6 months.  If lipids continue to be not at goal, further therapy can be considered.  Patient himself prefers to not be on any medications.  Therefore Zetia will be discontinued today.  Recommend a repeat lipid panel in 6 months.    Hypertension: Blood pressure is at goal.  Continue carvedilol, irbesartan, hydrochlorothiazide and triamterene at the current dose.  He has chronic kidney disease but his renal function has been mostly stable.    Dyspnea: Atypical symptoms.  Spontaneously resolved.  I suspect likely secondary to allergies.  Patient should continue to monitor symptoms and should he have recurrent symptoms, he should let us know.    Obstructive sleep apnea: Patient continues to be compliant with his CPAP.  Encouraged ongoing compliance.    Weight gain: Patient's main concern today is his weight.  We have discussed dietary modification.  He has also been encouraged to start exercising.  If he is unable to exercise he should at least try to walk more steps every day if possible.    Return to clinic in 6 months or earlier if needed.    Thank you for allowing me to participate in the care of this patient. Please do not hesitate to contact me with any questions.    Jen Roberson MD  Cardiologist  [facility]      PLEASE NOTE: This dictation was created using voice recognition software.             Yannick Rodríguez M.D.  [address] [facility] [address]  [address]  [place] [id number]  VIA Facsimile: [phone]

## 2018-07-27 NOTE — Clinical Note
Bienvenido Gallego,  Can you make sure this patient has follow-up in the pacemaker clinic in about 6 months? Thank you  AA

## 2019-02-07 ENCOUNTER — HOSPITAL ENCOUNTER (OUTPATIENT)
Dept: LAB | Facility: MEDICAL CENTER | Age: 69
End: 2019-02-07
Attending: FAMILY MEDICINE
Payer: COMMERCIAL

## 2019-02-07 LAB
ALBUMIN SERPL BCP-MCNC: 4.3 G/DL (ref 3.2–4.9)
ALBUMIN/GLOB SERPL: 1.5 G/DL
ALP SERPL-CCNC: 60 U/L (ref 30–99)
ALT SERPL-CCNC: 13 U/L (ref 2–50)
ANION GAP SERPL CALC-SCNC: 9 MMOL/L (ref 0–11.9)
AST SERPL-CCNC: 15 U/L (ref 12–45)
BILIRUB SERPL-MCNC: 0.5 MG/DL (ref 0.1–1.5)
BUN SERPL-MCNC: 37 MG/DL (ref 8–22)
CALCIUM SERPL-MCNC: 10.6 MG/DL (ref 8.5–10.5)
CHLORIDE SERPL-SCNC: 101 MMOL/L (ref 96–112)
CHOLEST SERPL-MCNC: 133 MG/DL (ref 100–199)
CO2 SERPL-SCNC: 28 MMOL/L (ref 20–33)
CREAT SERPL-MCNC: 1.92 MG/DL (ref 0.5–1.4)
EST. AVERAGE GLUCOSE BLD GHB EST-MCNC: 114 MG/DL
FASTING STATUS PATIENT QL REPORTED: NORMAL
GLOBULIN SER CALC-MCNC: 2.8 G/DL (ref 1.9–3.5)
GLUCOSE SERPL-MCNC: 108 MG/DL (ref 65–99)
HBA1C MFR BLD: 5.6 % (ref 0–5.6)
HDLC SERPL-MCNC: 28 MG/DL
LDLC SERPL CALC-MCNC: 88 MG/DL
POTASSIUM SERPL-SCNC: 4.3 MMOL/L (ref 3.6–5.5)
PROT SERPL-MCNC: 7.1 G/DL (ref 6–8.2)
SODIUM SERPL-SCNC: 138 MMOL/L (ref 135–145)
TRIGL SERPL-MCNC: 85 MG/DL (ref 0–149)

## 2019-02-07 PROCEDURE — 80061 LIPID PANEL: CPT

## 2019-02-07 PROCEDURE — 83036 HEMOGLOBIN GLYCOSYLATED A1C: CPT

## 2019-02-07 PROCEDURE — 80053 COMPREHEN METABOLIC PANEL: CPT

## 2019-02-07 PROCEDURE — 36415 COLL VENOUS BLD VENIPUNCTURE: CPT

## 2019-03-13 ENCOUNTER — OFFICE VISIT (OUTPATIENT)
Dept: NEPHROLOGY | Facility: MEDICAL CENTER | Age: 69
End: 2019-03-13
Payer: COMMERCIAL

## 2019-03-13 VITALS
HEART RATE: 60 BPM | HEIGHT: 69 IN | WEIGHT: 221 LBS | TEMPERATURE: 97.5 F | DIASTOLIC BLOOD PRESSURE: 58 MMHG | RESPIRATION RATE: 14 BRPM | OXYGEN SATURATION: 96 % | SYSTOLIC BLOOD PRESSURE: 98 MMHG | BODY MASS INDEX: 32.73 KG/M2

## 2019-03-13 DIAGNOSIS — I10 ESSENTIAL HYPERTENSION: ICD-10-CM

## 2019-03-13 DIAGNOSIS — N18.30 CKD (CHRONIC KIDNEY DISEASE) STAGE 3, GFR 30-59 ML/MIN (HCC): ICD-10-CM

## 2019-03-13 PROCEDURE — 99214 OFFICE O/P EST MOD 30 MIN: CPT | Performed by: INTERNAL MEDICINE

## 2019-03-13 ASSESSMENT — ENCOUNTER SYMPTOMS
VOMITING: 0
NAUSEA: 0
CHILLS: 0
COUGH: 0
FEVER: 0
SHORTNESS OF BREATH: 0
HYPERTENSION: 1

## 2019-03-13 NOTE — PROGRESS NOTES
"Subjective:      Channing Grande is a 68 y.o. male who presents with Hypertension and Chronic Kidney Disease            The patient is a very pleasant 68-year-old gentleman with a past medical history significant for long-standing hypertension, chronic kidney disease  Recently was on a weight loss program and lost about 10 pounds, his blood pressure has been on the lower side, patient has occasional lightheadedness      Hypertension   This is a chronic problem. The current episode started more than 1 year ago. The problem has been waxing and waning since onset. The problem is controlled. Pertinent negatives include no chest pain, malaise/fatigue, peripheral edema or shortness of breath. Risk factors for coronary artery disease include male gender. Past treatments include angiotensin blockers, beta blockers and diuretics. The current treatment provides significant improvement. There are no compliance problems.  Hypertensive end-organ damage includes kidney disease. Identifiable causes of hypertension include chronic renal disease.   Chronic Kidney Disease   This is a chronic problem. The current episode started more than 1 year ago. The problem occurs constantly. The problem has been waxing and waning. Pertinent negatives include no chest pain, chills, coughing, fever, nausea, urinary symptoms or vomiting.       Review of Systems   Constitutional: Negative for chills, fever and malaise/fatigue.   Respiratory: Negative for cough and shortness of breath.    Cardiovascular: Negative for chest pain and leg swelling.   Gastrointestinal: Negative for nausea and vomiting.   Genitourinary: Negative for dysuria, frequency and urgency.          Objective:     BP (!) 98/58 (BP Location: Right arm, Patient Position: Sitting, BP Cuff Size: Adult)   Pulse 60   Temp 36.4 °C (97.5 °F) (Temporal)   Resp 14   Ht 1.753 m (5' 9\")   Wt 100.2 kg (221 lb)   SpO2 96%   BMI 32.64 kg/m²      Physical Exam   Constitutional: He is " oriented to person, place, and time.   HENT:   Right Ear: External ear normal.   Left Ear: External ear normal.   Nose: Nose normal.   Eyes: Conjunctivae are normal. Right eye exhibits no discharge. Left eye exhibits no discharge.   Cardiovascular: Normal rate and regular rhythm.    Pulmonary/Chest: Effort normal and breath sounds normal. No respiratory distress. He has no wheezes.   Musculoskeletal: He exhibits no edema.   Neurological: He is alert and oriented to person, place, and time.   Skin: Skin is warm.   Psychiatric: He has a normal mood and affect. His behavior is normal.   Nursing note and vitals reviewed.              Assessment/Plan:     1. Essential hypertension  Blood pressure is on the lower side  Patient was advised to continue low-sodium diet  Stop diuretics  Decrease irbesartan dose to 150 mg daily  Check blood pressure regularly    2. CKD (chronic kidney disease) stage 3, GFR 30-59 ml/min ([facility])  His recent increase in creatinine is probably secondary to prerenal component from low blood pressure  Patient has no uremic symptoms  Recheck labs after decreasing antihypertensive medication  Renal dose of medication  Avoid nephrotoxins

## 2019-03-25 DIAGNOSIS — I10 ESSENTIAL HYPERTENSION: ICD-10-CM

## 2019-03-25 RX ORDER — AMLODIPINE BESYLATE 5 MG/1
5 TABLET ORAL DAILY
Qty: 30 TAB | Refills: 11 | Status: SHIPPED | OUTPATIENT
Start: 2019-03-25 | End: 2019-03-26

## 2019-03-26 ENCOUNTER — HOSPITAL ENCOUNTER (OUTPATIENT)
Dept: LAB | Facility: MEDICAL CENTER | Age: 69
End: 2019-03-26
Attending: INTERNAL MEDICINE
Payer: COMMERCIAL

## 2019-03-26 DIAGNOSIS — I10 ESSENTIAL HYPERTENSION: ICD-10-CM

## 2019-03-26 DIAGNOSIS — N18.30 CKD (CHRONIC KIDNEY DISEASE) STAGE 3, GFR 30-59 ML/MIN (HCC): ICD-10-CM

## 2019-03-26 LAB
ANION GAP SERPL CALC-SCNC: 5 MMOL/L (ref 0–11.9)
BUN SERPL-MCNC: 25 MG/DL (ref 8–22)
CALCIUM SERPL-MCNC: 9.4 MG/DL (ref 8.5–10.5)
CHLORIDE SERPL-SCNC: 105 MMOL/L (ref 96–112)
CO2 SERPL-SCNC: 28 MMOL/L (ref 20–33)
CREAT SERPL-MCNC: 1.67 MG/DL (ref 0.5–1.4)
CREAT UR-MCNC: 131.8 MG/DL
GLUCOSE SERPL-MCNC: 99 MG/DL (ref 65–99)
MICROALBUMIN UR-MCNC: <0.7 MG/DL
MICROALBUMIN/CREAT UR: NORMAL MG/G (ref 0–30)
POTASSIUM SERPL-SCNC: 4.1 MMOL/L (ref 3.6–5.5)
SODIUM SERPL-SCNC: 138 MMOL/L (ref 135–145)

## 2019-03-26 PROCEDURE — 80048 BASIC METABOLIC PNL TOTAL CA: CPT

## 2019-03-26 PROCEDURE — 82043 UR ALBUMIN QUANTITATIVE: CPT

## 2019-03-26 PROCEDURE — 82570 ASSAY OF URINE CREATININE: CPT

## 2019-03-26 PROCEDURE — 36415 COLL VENOUS BLD VENIPUNCTURE: CPT

## 2019-04-25 ENCOUNTER — NON-PROVIDER VISIT (OUTPATIENT)
Dept: CARDIOLOGY | Facility: MEDICAL CENTER | Age: 69
End: 2019-04-25
Payer: COMMERCIAL

## 2019-04-25 ENCOUNTER — OFFICE VISIT (OUTPATIENT)
Dept: CARDIOLOGY | Facility: MEDICAL CENTER | Age: 69
End: 2019-04-25
Payer: COMMERCIAL

## 2019-04-25 VITALS
DIASTOLIC BLOOD PRESSURE: 70 MMHG | WEIGHT: 217 LBS | HEART RATE: 60 BPM | BODY MASS INDEX: 32.14 KG/M2 | OXYGEN SATURATION: 94 % | HEIGHT: 69 IN | SYSTOLIC BLOOD PRESSURE: 122 MMHG

## 2019-04-25 DIAGNOSIS — Z95.0 CARDIAC PACEMAKER IN SITU: ICD-10-CM

## 2019-04-25 DIAGNOSIS — G47.33 OSA (OBSTRUCTIVE SLEEP APNEA): ICD-10-CM

## 2019-04-25 DIAGNOSIS — E78.49 OTHER HYPERLIPIDEMIA: ICD-10-CM

## 2019-04-25 DIAGNOSIS — Z79.899 ENCOUNTER FOR LONG-TERM (CURRENT) USE OF HIGH-RISK MEDICATION: ICD-10-CM

## 2019-04-25 DIAGNOSIS — I10 ESSENTIAL HYPERTENSION: ICD-10-CM

## 2019-04-25 PROCEDURE — 99214 OFFICE O/P EST MOD 30 MIN: CPT | Performed by: INTERNAL MEDICINE

## 2019-04-25 PROCEDURE — 93280 PM DEVICE PROGR EVAL DUAL: CPT | Performed by: INTERNAL MEDICINE

## 2019-04-25 RX ORDER — IRBESARTAN 300 MG/1
TABLET ORAL
COMMUNITY
Start: 2019-02-07 | End: 2019-04-25

## 2019-04-25 RX ORDER — NEOMYCIN SULFATE, POLYMYXIN B SULFATE, AND DEXAMETHASONE 3.5; 10000; 1 MG/G; [USP'U]/G; MG/G
OINTMENT OPHTHALMIC
COMMUNITY
Start: 2019-03-11 | End: 2019-04-25

## 2019-04-25 ASSESSMENT — ENCOUNTER SYMPTOMS
SHORTNESS OF BREATH: 0
ABDOMINAL PAIN: 0
DEPRESSION: 0
PND: 0
LOSS OF CONSCIOUSNESS: 0
FALLS: 0
ORTHOPNEA: 0
PALPITATIONS: 0
DIZZINESS: 0

## 2019-04-25 NOTE — LETTER
[facility]   [address] [place] [address]  [place] [address]  Phone: [phone]  Fax: [phone]              Channing Grande  1950    Encounter Date: 4/25/2019    Jen Roberson M.D.          PROGRESS NOTE:  Chief Complaint   Patient presents with   • Shortness of Breath   • Hyperlipidemia   • HTN (Controlled)     Subjective:   Channing Grande is a 68-year-old male presented clinic for follow-up on his hypertension.    Pertinent history  History of sick sinus syndrome status post permanent pacemaker.  Obstructive sleep apnea on CPAP.  Hyperlipidemia. Intolerant to statins. Developed hepatitis while on statins about 20 years ago.  Hypertension  CKD    Patient reports that his blood pressures have been at goal recently.  A few weeks ago his Avapro dose was decreased because he had an episode of dizziness with systolic blood pressure in the 90s.  Shortly after reduction in his dose, his blood pressure went up into the 160s to 180s systolic.  After that his medication was returned to its original dose and his blood pressures have been well controlled.    His pacemaker was interrogated today showing normal device function.  Patient is ventricularly paced about 26% of the time.  Mode switch episodes could not be measured as the counters have not been cleared since 2015.  On his last interrogation in July 2018 he had less than 1% of mode switch since 2015.      He denies any palpitations.  At his last visit, his Zetia was discontinued.  Since then he has been watching his diet very closely.  He has been losing weight with this.  He is looking forward to June when he can start exercising.    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    Past Medical History:   Diagnosis Date   • Arthritis     knee/fingers bilat   • Hepatitis C    • Hypertension    • Jaundice    • Sleep apnea    • Snoring      Past Surgical History:     Procedure Laterality Date   • KNEE ARTHROSCOPY Right 4/29/2016    Procedure: KNEE ARTHROSCOPY;  Surgeon: Yesenia Grigsby M.D.;  Location: SURGERY [facility];  Service:    • CHONDROPLASTY Right 4/29/2016    Procedure: CHONDROPLASTY;  Surgeon: Yesenia Grigsby M.D.;  Location: SURGERY [facility];  Service:    • MENISCECTOMY Right 4/29/2016    Procedure: MENISCECTOMY - PARTIAL LATERAL;  Surgeon: Yesenia Grigsby M.D.;  Location: SURGERY [facility];  Service:    • RECOVERY  12/5/2013    Performed by Cath-Recovery Surgery at SURGERY SAME DAY [facility] ORS   • PACEMAKER INSERTION  2013   • APPENDECTOMY  1996     Family History   Problem Relation Age of Onset   • Heart Disease Father    • Breast Cancer Mother    • Cancer Mother      Social History     Social History   • Marital status:      Spouse name: N/A   • Number of children: N/A   • Years of education: N/A     Occupational History   • Not on file.     Social History Main Topics   • Smoking status: Former Smoker     Packs/day: 1.50     Years: 35.00     Types: Cigarettes     Quit date: 4/25/1981   • Smokeless tobacco: Never Used   • Alcohol use No   • Drug use: No   • Sexual activity: Not on file     Other Topics Concern   • Not on file     Social History Narrative   • No narrative on file     Allergies   Allergen Reactions   • Amlodipine Swelling   • Ancef [Cefazolin Sodium]      N/V    Per patient No know allergies      Outpatient Encounter Prescriptions as of 4/25/2019   Medication Sig Dispense Refill   • ASPIRIN 81 PO Take  by mouth.     • MAGNESIUM PO Take  by mouth.     • triamterene/hctz (DYAZIDE) 37.5-25 MG Cap Take 1 Cap by mouth every morning.     • irbesartan (AVAPRO) 150 MG Tab Take 150 mg by mouth 2 Times a Day.     • carvedilol (COREG) 6.25 MG Tab Take 6.25 mg by mouth 2 times a day, with meals.     • [DISCONTINUED] irbesartan (AVAPRO) 300 MG Tab      • [DISCONTINUED] neomycin-polymixin-dexamethasone (MAXITROL)  "3.5-02096-0.1 Ointment ophthalmic ointment      • [DISCONTINUED] trazodone (DESYREL) 100 MG Tab Take 1 Tab by mouth at bedtime as needed for Sleep. (Patient not taking: Reported on 3/13/2019) 30 Tab 3   • calcium carbonate (TUMS) 500 MG Chew Tab Take 500 mg by mouth every day.       No facility-administered encounter medications on file as of 4/25/2019.      Review of Systems   Constitutional: Negative for malaise/fatigue.   Respiratory: Negative for shortness of breath.    Cardiovascular: Negative for chest pain, palpitations, orthopnea, leg swelling and PND.   Gastrointestinal: Negative for abdominal pain.   Musculoskeletal: Negative for falls.   Neurological: Negative for dizziness and loss of consciousness.   Psychiatric/Behavioral: Negative for depression.   All other systems reviewed and are negative.       Objective:   /70 (BP Location: Left arm, Patient Position: Sitting, BP Cuff Size: Adult)   Pulse 60   Ht 1.753 m (5' 9\")   Wt 98.4 kg (217 lb)   SpO2 94%   BMI 32.05 kg/m²      Physical Exam   Constitutional: He is oriented to person, place, and time. He appears well-developed and well-nourished. No distress.   HENT:   Head: Normocephalic and atraumatic.   Eyes: Conjunctivae are normal. No scleral icterus.   Neck: Normal range of motion. Neck supple.   Cardiovascular: Normal rate, regular rhythm and normal heart sounds.  Exam reveals no gallop and no friction rub.    No murmur heard.  Pulmonary/Chest: Effort normal and breath sounds normal. No respiratory distress. He has no wheezes. He has no rales.   Abdominal: Soft. He exhibits no distension. There is no tenderness.   Musculoskeletal: He exhibits no edema.   Neurological: He is alert and oriented to person, place, and time.   Skin: Skin is warm and dry. He is not diaphoretic.   Psychiatric: He has a normal mood and affect. His behavior is normal.   Nursing note and vitals reviewed.    Echocardiogram performed in May 2018 showed a normal LV " systolic function.  EF 65%.  Aortic valve sclerosis without stenosis.  RVSP could not be estimated due to inadequate TR jet.    Labs performed in March 2019 were reviewed and showed creatinine 1.67.  Baseline creatinine ranging between 1.5-1.9.  Normal potassium.    Lipid panel in February 2019 showed LDL 88.  HDL 28.    Assessment:     1. PRINCESS (obstructive sleep apnea)     2. Cardiac pacemaker in situ     3. Essential hypertension     4. Other hyperlipidemia     5. Encounter for long-term (current) use of high-risk medication         Medical Decision Making:  Today's Assessment / Status / Plan:     Hypertension: Blood pressure is at goal.  Continue carvedilol, triamterene, hydrochlorothiazide and irbesartan at current dose.  His renal function is at baseline.    Hyperlipidemia: His LDL is at goal.  He is currently not on any medications.  Continue dietary modification.    Status post permanent pacemaker: Device was interrogated today showing normal device function.  His device will be interrogated again in 3 months to evaluate for mode switch.    Return to clinic in 6 months or earlier if needed.    Thank you for allowing me to participate in the care of this patient. Please do not hesitate to contact me with any questions.    Jen Roberson MD  Cardiologist  [facility]      PLEASE NOTE: This dictation was created using voice recognition software.             Yannick Rodríguez M.D.  [address] [facility] [address]  [address]  [place] [id number]  VIA Facsimile: [phone]

## 2019-04-25 NOTE — PROGRESS NOTES
Chief Complaint   Patient presents with   • Shortness of Breath   • Hyperlipidemia   • HTN (Controlled)     Subjective:   Channing Grande is a 68-year-old male presented clinic for follow-up on his hypertension.    Pertinent history  History of sick sinus syndrome status post permanent pacemaker.  Obstructive sleep apnea on CPAP.  Hyperlipidemia. Intolerant to statins. Developed hepatitis while on statins about 20 years ago.  Hypertension  CKD    Patient reports that his blood pressures have been at goal recently.  A few weeks ago his Avapro dose was decreased because he had an episode of dizziness with systolic blood pressure in the 90s.  Shortly after reduction in his dose, his blood pressure went up into the 160s to 180s systolic.  After that his medication was returned to its original dose and his blood pressures have been well controlled.    His pacemaker was interrogated today showing normal device function.  Patient is ventricularly paced about 26% of the time.  Mode switch episodes could not be measured as the counters have not been cleared since 2015.  On his last interrogation in July 2018 he had less than 1% of mode switch since 2015.      He denies any palpitations.  At his last visit, his Zetia was discontinued.  Since then he has been watching his diet very closely.  He has been losing weight with this.  He is looking forward to June when he can start exercising.    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    Past Medical History:   Diagnosis Date   • Arthritis     knee/fingers bilat   • Hepatitis C    • Hypertension    • Jaundice    • Sleep apnea    • Snoring      Past Surgical History:   Procedure Laterality Date   • KNEE ARTHROSCOPY Right 4/29/2016    Procedure: KNEE ARTHROSCOPY;  Surgeon: Yesenia Grigsby M.D.;  Location: SURGERY [facility];  Service:    • CHONDROPLASTY Right 4/29/2016    Procedure: CHONDROPLASTY;  Surgeon: Yesenia  SHEA Grigsby;  Location: SURGERY [facility];  Service:    • MENISCECTOMY Right 4/29/2016    Procedure: MENISCECTOMY - PARTIAL LATERAL;  Surgeon: Yesenia Grigsby M.D.;  Location: SURGERY [facility];  Service:    • RECOVERY  12/5/2013    Performed by Cath-Recovery Surgery at SURGERY SAME DAY [facility] ORS   • PACEMAKER INSERTION  2013   • APPENDECTOMY  1996     Family History   Problem Relation Age of Onset   • Heart Disease Father    • Breast Cancer Mother    • Cancer Mother      Social History     Social History   • Marital status:      Spouse name: N/A   • Number of children: N/A   • Years of education: N/A     Occupational History   • Not on file.     Social History Main Topics   • Smoking status: Former Smoker     Packs/day: 1.50     Years: 35.00     Types: Cigarettes     Quit date: 4/25/1981   • Smokeless tobacco: Never Used   • Alcohol use No   • Drug use: No   • Sexual activity: Not on file     Other Topics Concern   • Not on file     Social History Narrative   • No narrative on file     Allergies   Allergen Reactions   • Amlodipine Swelling   • Ancef [Cefazolin Sodium]      N/V    Per patient No know allergies      Outpatient Encounter Prescriptions as of 4/25/2019   Medication Sig Dispense Refill   • ASPIRIN 81 PO Take  by mouth.     • MAGNESIUM PO Take  by mouth.     • triamterene/hctz (DYAZIDE) 37.5-25 MG Cap Take 1 Cap by mouth every morning.     • irbesartan (AVAPRO) 150 MG Tab Take 150 mg by mouth 2 Times a Day.     • carvedilol (COREG) 6.25 MG Tab Take 6.25 mg by mouth 2 times a day, with meals.     • [DISCONTINUED] irbesartan (AVAPRO) 300 MG Tab      • [DISCONTINUED] neomycin-polymixin-dexamethasone (MAXITROL) 3.5-41425-5.1 Ointment ophthalmic ointment      • [DISCONTINUED] trazodone (DESYREL) 100 MG Tab Take 1 Tab by mouth at bedtime as needed for Sleep. (Patient not taking: Reported on 3/13/2019) 30 Tab 3   • calcium carbonate (TUMS) 500 MG Chew Tab Take 500 mg by mouth  "every day.       No facility-administered encounter medications on file as of 4/25/2019.      Review of Systems   Constitutional: Negative for malaise/fatigue.   Respiratory: Negative for shortness of breath.    Cardiovascular: Negative for chest pain, palpitations, orthopnea, leg swelling and PND.   Gastrointestinal: Negative for abdominal pain.   Musculoskeletal: Negative for falls.   Neurological: Negative for dizziness and loss of consciousness.   Psychiatric/Behavioral: Negative for depression.   All other systems reviewed and are negative.       Objective:   /70 (BP Location: Left arm, Patient Position: Sitting, BP Cuff Size: Adult)   Pulse 60   Ht 1.753 m (5' 9\")   Wt 98.4 kg (217 lb)   SpO2 94%   BMI 32.05 kg/m²     Physical Exam   Constitutional: He is oriented to person, place, and time. He appears well-developed and well-nourished. No distress.   HENT:   Head: Normocephalic and atraumatic.   Eyes: Conjunctivae are normal. No scleral icterus.   Neck: Normal range of motion. Neck supple.   Cardiovascular: Normal rate, regular rhythm and normal heart sounds.  Exam reveals no gallop and no friction rub.    No murmur heard.  Pulmonary/Chest: Effort normal and breath sounds normal. No respiratory distress. He has no wheezes. He has no rales.   Abdominal: Soft. He exhibits no distension. There is no tenderness.   Musculoskeletal: He exhibits no edema.   Neurological: He is alert and oriented to person, place, and time.   Skin: Skin is warm and dry. He is not diaphoretic.   Psychiatric: He has a normal mood and affect. His behavior is normal.   Nursing note and vitals reviewed.    Echocardiogram performed in May 2018 showed a normal LV systolic function.  EF 65%.  Aortic valve sclerosis without stenosis.  RVSP could not be estimated due to inadequate TR jet.    Labs performed in March 2019 were reviewed and showed creatinine 1.67.  Baseline creatinine ranging between 1.5-1.9.  Normal " potassium.    Lipid panel in February 2019 showed LDL 88.  HDL 28.    Assessment:     1. PRINCESS (obstructive sleep apnea)     2. Cardiac pacemaker in situ     3. Essential hypertension     4. Other hyperlipidemia     5. Encounter for long-term (current) use of high-risk medication         Medical Decision Making:  Today's Assessment / Status / Plan:     Hypertension: Blood pressure is at goal.  Continue carvedilol, triamterene, hydrochlorothiazide and irbesartan at current dose.  His renal function is at baseline.    Hyperlipidemia: His LDL is at goal.  He is currently not on any medications.  Continue dietary modification.    Status post permanent pacemaker: Device was interrogated today showing normal device function.  His device will be interrogated again in 3 months to evaluate for mode switch.    Return to clinic in 6 months or earlier if needed.    Thank you for allowing me to participate in the care of this patient. Please do not hesitate to contact me with any questions.    Jen Roberson MD  Cardiologist  [facility]      PLEASE NOTE: This dictation was created using voice recognition software.

## 2019-07-25 ENCOUNTER — NON-PROVIDER VISIT (OUTPATIENT)
Dept: CARDIOLOGY | Facility: MEDICAL CENTER | Age: 69
End: 2019-07-25
Payer: COMMERCIAL

## 2019-07-25 DIAGNOSIS — Z95.0 CARDIAC PACEMAKER IN SITU: ICD-10-CM

## 2019-07-25 PROCEDURE — 93280 PM DEVICE PROGR EVAL DUAL: CPT | Performed by: INTERNAL MEDICINE

## 2019-08-21 ENCOUNTER — HOSPITAL ENCOUNTER (OUTPATIENT)
Dept: LAB | Facility: MEDICAL CENTER | Age: 69
End: 2019-08-21
Attending: FAMILY MEDICINE
Payer: COMMERCIAL

## 2019-08-21 LAB
ALBUMIN SERPL BCP-MCNC: 4 G/DL (ref 3.2–4.9)
ALBUMIN/GLOB SERPL: 1.5 G/DL
ALP SERPL-CCNC: 57 U/L (ref 30–99)
ALT SERPL-CCNC: 12 U/L (ref 2–50)
ANION GAP SERPL CALC-SCNC: 7 MMOL/L (ref 0–11.9)
AST SERPL-CCNC: 13 U/L (ref 12–45)
BILIRUB SERPL-MCNC: 0.8 MG/DL (ref 0.1–1.5)
BUN SERPL-MCNC: 34 MG/DL (ref 8–22)
CALCIUM SERPL-MCNC: 9.7 MG/DL (ref 8.5–10.5)
CHLORIDE SERPL-SCNC: 104 MMOL/L (ref 96–112)
CHOLEST SERPL-MCNC: 184 MG/DL (ref 100–199)
CO2 SERPL-SCNC: 29 MMOL/L (ref 20–33)
CREAT SERPL-MCNC: 1.73 MG/DL (ref 0.5–1.4)
FASTING STATUS PATIENT QL REPORTED: NORMAL
GLOBULIN SER CALC-MCNC: 2.6 G/DL (ref 1.9–3.5)
GLUCOSE SERPL-MCNC: 109 MG/DL (ref 65–99)
HDLC SERPL-MCNC: 33 MG/DL
LDLC SERPL CALC-MCNC: 132 MG/DL
POTASSIUM SERPL-SCNC: 4.3 MMOL/L (ref 3.6–5.5)
PROT SERPL-MCNC: 6.6 G/DL (ref 6–8.2)
PSA SERPL-MCNC: 0.7 NG/ML (ref 0–4)
PTH-INTACT SERPL-MCNC: 45 PG/ML (ref 14–72)
SODIUM SERPL-SCNC: 140 MMOL/L (ref 135–145)
TRIGL SERPL-MCNC: 94 MG/DL (ref 0–149)

## 2019-08-21 PROCEDURE — 84153 ASSAY OF PSA TOTAL: CPT

## 2019-08-21 PROCEDURE — 83970 ASSAY OF PARATHORMONE: CPT

## 2019-08-21 PROCEDURE — 80053 COMPREHEN METABOLIC PANEL: CPT

## 2019-08-21 PROCEDURE — 36415 COLL VENOUS BLD VENIPUNCTURE: CPT

## 2019-08-21 PROCEDURE — 80061 LIPID PANEL: CPT

## 2019-10-22 ENCOUNTER — NON-PROVIDER VISIT (OUTPATIENT)
Dept: CARDIOLOGY | Facility: MEDICAL CENTER | Age: 69
End: 2019-10-22
Payer: COMMERCIAL

## 2019-10-22 ENCOUNTER — OFFICE VISIT (OUTPATIENT)
Dept: CARDIOLOGY | Facility: MEDICAL CENTER | Age: 69
End: 2019-10-22
Payer: COMMERCIAL

## 2019-10-22 VITALS
HEIGHT: 69 IN | HEART RATE: 60 BPM | BODY MASS INDEX: 33.18 KG/M2 | WEIGHT: 224 LBS | DIASTOLIC BLOOD PRESSURE: 72 MMHG | OXYGEN SATURATION: 95 % | SYSTOLIC BLOOD PRESSURE: 130 MMHG

## 2019-10-22 DIAGNOSIS — Z79.899 ENCOUNTER FOR LONG-TERM (CURRENT) USE OF HIGH-RISK MEDICATION: ICD-10-CM

## 2019-10-22 DIAGNOSIS — E78.49 OTHER HYPERLIPIDEMIA: ICD-10-CM

## 2019-10-22 DIAGNOSIS — G47.33 OSA (OBSTRUCTIVE SLEEP APNEA): ICD-10-CM

## 2019-10-22 DIAGNOSIS — Z95.0 CARDIAC PACEMAKER IN SITU: ICD-10-CM

## 2019-10-22 DIAGNOSIS — I44.30 ATRIOVENTRICULAR BLOCK: ICD-10-CM

## 2019-10-22 DIAGNOSIS — I10 ESSENTIAL HYPERTENSION: ICD-10-CM

## 2019-10-22 PROCEDURE — 99215 OFFICE O/P EST HI 40 MIN: CPT | Mod: 25 | Performed by: INTERNAL MEDICINE

## 2019-10-22 PROCEDURE — 93280 PM DEVICE PROGR EVAL DUAL: CPT | Performed by: INTERNAL MEDICINE

## 2019-10-22 ASSESSMENT — ENCOUNTER SYMPTOMS
DEPRESSION: 0
DIZZINESS: 0
ORTHOPNEA: 0
SHORTNESS OF BREATH: 0
PALPITATIONS: 0
FALLS: 0
LOSS OF CONSCIOUSNESS: 0
ABDOMINAL PAIN: 0
PND: 0

## 2019-10-22 NOTE — PROGRESS NOTES
Chief Complaint   Patient presents with   • HTN (Controlled)   • Hyperlipidemia     Subjective:   Channing Grande is a 68-year-old male presented clinic for follow-up on hypertension.    Pertinent history  History of sick sinus syndrome status post permanent pacemaker.   Hypertension  Hyperlipidemia. Intolerant to statins. Developed hepatitis while on statins about 20 years ago.  CKD  Obstructive sleep apnea on CPAP.    Pacemaker was interrogated today showing normal device function.  Patient was ventricularly paced about 28% of the time.  No episodes of mode switch.    He reports that his blood pressures have been mostly in the 130s systolic.    Patient reports that since his last visit he has not been able to exercise much as he has been busy at work.  He also has gained some weight as he has not been watching his diet regularly.  Of note, he reports today that his diarrhea that his PCP had ordered for him in 2018, he never actually started that medication.  He is known to be intolerant to statins.    He has a diagnosis of sleep apnea and has been using his CPAP on a daily basis.      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    Past Medical History:   Diagnosis Date   • Arthritis     knee/fingers bilat   • Hepatitis C    • Hypertension    • Jaundice    • Sleep apnea    • Snoring      Past Surgical History:   Procedure Laterality Date   • KNEE ARTHROSCOPY Right 4/29/2016    Procedure: KNEE ARTHROSCOPY;  Surgeon: Yesenia Grigsby M.D.;  Location: [facility];  Service:    • CHONDROPLASTY Right 4/29/2016    Procedure: CHONDROPLASTY;  Surgeon: Yesenia Grigsby M.D.;  Location: [facility];  Service:    • MENISCECTOMY Right 4/29/2016    Procedure: MENISCECTOMY - PARTIAL LATERAL;  Surgeon: Yesenia Grigsby M.D.;  Location: [facility];  Service:    • RECOVERY  12/5/2013    Performed by [facility]Recovery Surgery at [facility]  [facility] ORS   • PACEMAKER INSERTION     • APPENDECTOMY       Family History   Problem Relation Age of Onset   • Heart Disease Father    • Breast Cancer Mother    • Cancer Mother      Social History     Socioeconomic History   • Marital status:      Spouse name: Not on file   • Number of children: Not on file   • Years of education: Not on file   • Highest education level: Not on file   Occupational History   • Not on file   Social Needs   • Financial resource strain: Not on file   • Food insecurity:     Worry: Not on file     Inability: Not on file   • Transportation needs:     Medical: Not on file     Non-medical: Not on file   Tobacco Use   • Smoking status: Former Smoker     Packs/day: 1.50     Years: 35.00     Pack years: 52.50     Types: Cigarettes     Last attempt to quit: 1981     Years since quittin.5   • Smokeless tobacco: Never Used   Substance and Sexual Activity   • Alcohol use: No     Alcohol/week: 0.0 oz   • Drug use: No   • Sexual activity: Not on file   Lifestyle   • Physical activity:     Days per week: Not on file     Minutes per session: Not on file   • Stress: Not on file   Relationships   • Social connections:     Talks on phone: Not on file     Gets together: Not on file     Attends Yazidi service: Not on file     Active member of club or organization: Not on file     Attends meetings of clubs or organizations: Not on file     Relationship status: Not on file   • Intimate partner violence:     Fear of current or ex partner: Not on file     Emotionally abused: Not on file     Physically abused: Not on file     Forced sexual activity: Not on file   Other Topics Concern   • Not on file   Social History Narrative   • Not on file     Allergies   Allergen Reactions   • Amlodipine Swelling   • Ancef [Cefazolin Sodium]      N/V    Per patient No know allergies      Outpatient Encounter Medications as of 10/22/2019   Medication Sig Dispense Refill   • ASPIRIN 81 PO Take   "by mouth.     • MAGNESIUM PO Take  by mouth.     • triamterene/hctz (DYAZIDE) 37.5-25 MG Cap Take 1 Cap by mouth every morning.     • irbesartan (AVAPRO) 150 MG Tab Take 150 mg by mouth 2 Times a Day.     • carvedilol (COREG) 6.25 MG Tab Take 6.25 mg by mouth 2 times a day, with meals.     • calcium carbonate (TUMS) 500 MG Chew Tab Take 500 mg by mouth every day.       No facility-administered encounter medications on file as of 10/22/2019.      Review of Systems   Constitutional: Negative for malaise/fatigue.   Respiratory: Negative for shortness of breath.    Cardiovascular: Negative for chest pain, palpitations, orthopnea, leg swelling and PND.   Gastrointestinal: Negative for abdominal pain.   Musculoskeletal: Negative for falls.   Neurological: Negative for dizziness and loss of consciousness.   Psychiatric/Behavioral: Negative for depression.   All other systems reviewed and are negative.       Objective:   /72 (BP Location: Left arm, Patient Position: Sitting, BP Cuff Size: Adult)   Pulse 60   Ht 1.753 m (5' 9\")   Wt 101.6 kg (224 lb)   SpO2 95%   BMI 33.08 kg/m²     Physical Exam   Constitutional: He is oriented to person, place, and time. He appears well-developed and well-nourished. No distress.   HENT:   Head: Normocephalic and atraumatic.   Eyes: Conjunctivae are normal. No scleral icterus.   Neck: Normal range of motion. Neck supple.   Cardiovascular: Normal rate, regular rhythm and normal heart sounds. Exam reveals no gallop and no friction rub.   No murmur heard.  Pulmonary/Chest: Effort normal and breath sounds normal. No respiratory distress. He has no wheezes. He has no rales.   Abdominal: Soft. He exhibits no distension. There is no tenderness.   Musculoskeletal: He exhibits no edema.   Neurological: He is alert and oriented to person, place, and time.   Skin: Skin is warm and dry. He is not diaphoretic.   Psychiatric: He has a normal mood and affect. His behavior is normal.   Nursing " note and vitals reviewed.    Echocardiogram performed in May 2018 showed a normal LV systolic function.  EF 65%.  Aortic valve sclerosis without stenosis.  RVSP could not be estimated due to inadequate TR jet.    Labs performed in August 2019 were reviewed and showed creatinine 1.7, baseline creatinine between 1.5-1.9.  , higher than before.    Assessment:     1. Essential hypertension     2. Other hyperlipidemia     3. Cardiac pacemaker in situ     4. PRINCESS (obstructive sleep apnea)     5. Encounter for long-term (current) use of high-risk medication         Medical Decision Making:  Today's Assessment / Status / Plan:     Hypertension:  Chronic kidney disease:  Blood pressure is at goal.  Continue Coreg, irbesartan, triamterene and hydrochlorothiazide at current dose.  His renal function has been stable.  He has known chronic kidney disease.    Hyperlipidemia: His LDL is higher than it was previously.  His HDL is 33 which is low but fairly stable for the patient.  I have discussed various dietary modifications with him including healthier foods and more fish if possible.  He should also try to exercise regularly.    Status post permanent pacemaker: Device interrogation today shows normal device function.  Repeat interrogation in 6 months.    Obstructive sleep apnea: Patient has been compliant with his CPAP.  Encouraged ongoing compliance.    Return to clinic in 1 year or earlier if needed.    Thank you for allowing me to participate in the care of this patient. Please do not hesitate to contact me with any questions.    Jen Roberson MD  Cardiologist  [facility]      PLEASE NOTE: This dictation was created using voice recognition software.

## 2019-10-22 NOTE — LETTER
[facility]   [address] [place] [address]  [place] [address]  Phone: [phone]  Fax: [phone]              Channing Grande  1950    Encounter Date: 10/22/2019    Jen Roberson M.D.          PROGRESS NOTE:  Chief Complaint   Patient presents with   • HTN (Controlled)   • Hyperlipidemia     Subjective:   Channing Grande is a 68-year-old male presented clinic for follow-up on hypertension.    Pertinent history  History of sick sinus syndrome status post permanent pacemaker.   Hypertension  Hyperlipidemia. Intolerant to statins. Developed hepatitis while on statins about 20 years ago.  CKD  Obstructive sleep apnea on CPAP.    Pacemaker was interrogated today showing normal device function.  Patient was ventricularly paced about 28% of the time.  No episodes of mode switch.    He reports that his blood pressures have been mostly in the 130s systolic.    Patient reports that since his last visit he has not been able to exercise much as he has been busy at work.  He also has gained some weight as he has not been watching his diet regularly.  Of note, he reports today that his diarrhea that his PCP had ordered for him in 2018, he never actually started that medication.  He is known to be intolerant to statins.    He has a diagnosis of sleep apnea and has been using his CPAP on a daily basis.      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    Past Medical History:   Diagnosis Date   • Arthritis     knee/fingers bilat   • Hepatitis C    • Hypertension    • Jaundice    • Sleep apnea    • Snoring      Past Surgical History:   Procedure Laterality Date   • KNEE ARTHROSCOPY Right 4/29/2016    Procedure: KNEE ARTHROSCOPY;  Surgeon: Yesenia Grigsby M.D.;  Location: SURGERY [facility];  Service:    • CHONDROPLASTY Right 4/29/2016    Procedure: CHONDROPLASTY;  Surgeon: Yesenia Grigsby M.D.;  Location: SURGERY  [facility];  Service:    • MENISCECTOMY Right 2016    Procedure: MENISCECTOMY - PARTIAL LATERAL;  Surgeon: Yesenia Grigsby M.D.;  Location: SURGERY [facility];  Service:    • RECOVERY  2013    Performed by Cath-Recovery Surgery at SURGERY SAME DAY [facility] ORS   • PACEMAKER INSERTION     • APPENDECTOMY       Family History   Problem Relation Age of Onset   • Heart Disease Father    • Breast Cancer Mother    • Cancer Mother      Social History     Socioeconomic History   • Marital status:      Spouse name: Not on file   • Number of children: Not on file   • Years of education: Not on file   • Highest education level: Not on file   Occupational History   • Not on file   Social Needs   • Financial resource strain: Not on file   • Food insecurity:     Worry: Not on file     Inability: Not on file   • Transportation needs:     Medical: Not on file     Non-medical: Not on file   Tobacco Use   • Smoking status: Former Smoker     Packs/day: 1.50     Years: 35.00     Pack years: 52.50     Types: Cigarettes     Last attempt to quit: 1981     Years since quittin.5   • Smokeless tobacco: Never Used   Substance and Sexual Activity   • Alcohol use: No     Alcohol/week: 0.0 oz   • Drug use: No   • Sexual activity: Not on file   Lifestyle   • Physical activity:     Days per week: Not on file     Minutes per session: Not on file   • Stress: Not on file   Relationships   • Social connections:     Talks on phone: Not on file     Gets together: Not on file     Attends Pentecostalism service: Not on file     Active member of club or organization: Not on file     Attends meetings of clubs or organizations: Not on file     Relationship status: Not on file   • Intimate partner violence:     Fear of current or ex partner: Not on file     Emotionally abused: Not on file     Physically abused: Not on file     Forced sexual activity: Not on file   Other Topics Concern   • Not on file   Social  "History Narrative   • Not on file     Allergies   Allergen Reactions   • Amlodipine Swelling   • Ancef [Cefazolin Sodium]      N/V    Per patient No know allergies      Outpatient Encounter Medications as of 10/22/2019   Medication Sig Dispense Refill   • ASPIRIN 81 PO Take  by mouth.     • MAGNESIUM PO Take  by mouth.     • triamterene/hctz (DYAZIDE) 37.5-25 MG Cap Take 1 Cap by mouth every morning.     • irbesartan (AVAPRO) 150 MG Tab Take 150 mg by mouth 2 Times a Day.     • carvedilol (COREG) 6.25 MG Tab Take 6.25 mg by mouth 2 times a day, with meals.     • calcium carbonate (TUMS) 500 MG Chew Tab Take 500 mg by mouth every day.       No facility-administered encounter medications on file as of 10/22/2019.      Review of Systems   Constitutional: Negative for malaise/fatigue.   Respiratory: Negative for shortness of breath.    Cardiovascular: Negative for chest pain, palpitations, orthopnea, leg swelling and PND.   Gastrointestinal: Negative for abdominal pain.   Musculoskeletal: Negative for falls.   Neurological: Negative for dizziness and loss of consciousness.   Psychiatric/Behavioral: Negative for depression.   All other systems reviewed and are negative.       Objective:   /72 (BP Location: Left arm, Patient Position: Sitting, BP Cuff Size: Adult)   Pulse 60   Ht 1.753 m (5' 9\")   Wt 101.6 kg (224 lb)   SpO2 95%   BMI 33.08 kg/m²      Physical Exam   Constitutional: He is oriented to person, place, and time. He appears well-developed and well-nourished. No distress.   HENT:   Head: Normocephalic and atraumatic.   Eyes: Conjunctivae are normal. No scleral icterus.   Neck: Normal range of motion. Neck supple.   Cardiovascular: Normal rate, regular rhythm and normal heart sounds. Exam reveals no gallop and no friction rub.   No murmur heard.  Pulmonary/Chest: Effort normal and breath sounds normal. No respiratory distress. He has no wheezes. He has no rales.   Abdominal: Soft. He exhibits no " distension. There is no tenderness.   Musculoskeletal: He exhibits no edema.   Neurological: He is alert and oriented to person, place, and time.   Skin: Skin is warm and dry. He is not diaphoretic.   Psychiatric: He has a normal mood and affect. His behavior is normal.   Nursing note and vitals reviewed.    Echocardiogram performed in May 2018 showed a normal LV systolic function.  EF 65%.  Aortic valve sclerosis without stenosis.  RVSP could not be estimated due to inadequate TR jet.    Labs performed in August 2019 were reviewed and showed creatinine 1.7, baseline creatinine between 1.5-1.9.  , higher than before.    Assessment:     1. Essential hypertension     2. Other hyperlipidemia     3. Cardiac pacemaker in situ     4. PRINCESS (obstructive sleep apnea)     5. Encounter for long-term (current) use of high-risk medication         Medical Decision Making:  Today's Assessment / Status / Plan:     Hypertension:  Chronic kidney disease:  Blood pressure is at goal.  Continue Coreg, irbesartan, triamterene and hydrochlorothiazide at current dose.  His renal function has been stable.  He has known chronic kidney disease.    Hyperlipidemia: His LDL is higher than it was previously.  His HDL is 33 which is low but fairly stable for the patient.  I have discussed various dietary modifications with him including healthier foods and more fish if possible.  He should also try to exercise regularly.    Status post permanent pacemaker: Device interrogation today shows normal device function.  Repeat interrogation in 6 months.    Obstructive sleep apnea: Patient has been compliant with his CPAP.  Encouraged ongoing compliance.    Return to clinic in 1 year or earlier if needed.    Thank you for allowing me to participate in the care of this patient. Please do not hesitate to contact me with any questions.    Jen Roberson MD  Cardiologist  [facility]      PLEASE NOTE: This dictation was  created using voice recognition software.             Yannick Rodríguez M.D.  [id number]  [id number]  [place] [address]  VIA Facsimile: [phone]

## 2020-02-10 ENCOUNTER — TELEPHONE (OUTPATIENT)
Dept: CARDIOLOGY | Facility: MEDICAL CENTER | Age: 70
End: 2020-02-10

## 2020-02-10 NOTE — TELEPHONE ENCOUNTER
Called pt back. He states the past 3 weeks, his -185/70-86. Pt has been taking Irbesartan 150mg twice daily, Coreg 6.25mg twice daily. No changes to his medication regiment. Pt has been c/o headaches. Pt currently in [place] and will be back in [place] on Wednesday. He would like recommendations on medications and if he should come in for a follow up visit.      To Dr. Roberson - please advise on medication recommendations and if you want to see pt for evaluation. Thanks!

## 2020-02-10 NOTE — TELEPHONE ENCOUNTER
AA    Pt reports his b/p has getting higher the last 3 weeks. Pt would please like a call back to discuss soon at [phone].

## 2020-02-14 NOTE — TELEPHONE ENCOUNTER
Jen Roberson M.D.  You 1 hour ago (2:45 PM)      What his his heart rate?  If it is over 65, please increase carvedilol to 12.5 mg twice daily.  He can come in to see me (here or at [facility]) or JS in the next week depending on our availability.  If his blood pressure improves, he can come back in 2 weeks.       Attempted to call pt, no answer, LM for him to call back.

## 2020-02-18 ENCOUNTER — OFFICE VISIT (OUTPATIENT)
Dept: CARDIOLOGY | Facility: MEDICAL CENTER | Age: 70
End: 2020-02-18
Payer: COMMERCIAL

## 2020-02-18 VITALS
SYSTOLIC BLOOD PRESSURE: 120 MMHG | OXYGEN SATURATION: 95 % | RESPIRATION RATE: 16 BRPM | HEART RATE: 68 BPM | HEIGHT: 69 IN | WEIGHT: 226.96 LBS | BODY MASS INDEX: 33.62 KG/M2 | DIASTOLIC BLOOD PRESSURE: 68 MMHG

## 2020-02-18 DIAGNOSIS — I10 ESSENTIAL HYPERTENSION: ICD-10-CM

## 2020-02-18 DIAGNOSIS — Z95.0 CARDIAC PACEMAKER IN SITU: ICD-10-CM

## 2020-02-18 DIAGNOSIS — N18.30 CKD (CHRONIC KIDNEY DISEASE) STAGE 3, GFR 30-59 ML/MIN: ICD-10-CM

## 2020-02-18 DIAGNOSIS — I10 ESSENTIAL HYPERTENSION, BENIGN: ICD-10-CM

## 2020-02-18 DIAGNOSIS — I44.30 ATRIOVENTRICULAR BLOCK: ICD-10-CM

## 2020-02-18 PROCEDURE — 99214 OFFICE O/P EST MOD 30 MIN: CPT | Performed by: NURSE PRACTITIONER

## 2020-02-18 RX ORDER — IRBESARTAN 75 MG/1
75 TABLET ORAL
Qty: 180 TAB | Refills: 3 | Status: SHIPPED | OUTPATIENT
Start: 2020-02-18 | End: 2020-04-22 | Stop reason: SDUPTHER

## 2020-02-18 RX ORDER — CARVEDILOL 12.5 MG/1
12.5 TABLET ORAL 2 TIMES DAILY WITH MEALS
Qty: 180 TAB | Refills: 3 | Status: SHIPPED | OUTPATIENT
Start: 2020-02-18 | End: 2020-02-18 | Stop reason: SDUPTHER

## 2020-02-18 RX ORDER — IRBESARTAN 75 MG/1
75 TABLET ORAL
Qty: 180 TAB | Refills: 3 | Status: SHIPPED | OUTPATIENT
Start: 2020-02-18 | End: 2020-02-18 | Stop reason: SDUPTHER

## 2020-02-18 RX ORDER — CARVEDILOL 12.5 MG/1
12.5 TABLET ORAL 2 TIMES DAILY WITH MEALS
Qty: 180 TAB | Refills: 3 | Status: SHIPPED | OUTPATIENT
Start: 2020-02-18 | End: 2021-01-21

## 2020-02-18 RX ORDER — IRBESARTAN 300 MG/1
TABLET ORAL
COMMUNITY
Start: 2020-02-04 | End: 2020-02-18

## 2020-02-18 NOTE — TELEPHONE ENCOUNTER
Pt called back this morning. He states his SBP has improved to 140's however still high for his normal. His HR has been 60's-70's, depending on activity. Pt reports headaches. Advised AA's recommendations to increase Coreg dose to 12.5mg BID and a FV this week. Pt scheduled to see JS today at 4pm to discuss medications and symptoms. Pt verbalized understanding, appreciative of information.

## 2020-02-19 ENCOUNTER — TELEPHONE (OUTPATIENT)
Dept: SLEEP MEDICINE | Facility: MEDICAL CENTER | Age: 70
End: 2020-02-19

## 2020-02-19 DIAGNOSIS — G47.33 OSA (OBSTRUCTIVE SLEEP APNEA): ICD-10-CM

## 2020-02-19 ASSESSMENT — ENCOUNTER SYMPTOMS
SHORTNESS OF BREATH: 0
WEIGHT LOSS: 0
PALPITATIONS: 0
CLAUDICATION: 0
WEAKNESS: 0
DIZZINESS: 0
ORTHOPNEA: 0
PND: 0

## 2020-02-19 NOTE — PROGRESS NOTES
Chief Complaint   Patient presents with   • Hypertension       Subjective:   Channing Grande is a 69 y.o. male patient of Dr. Jen Roberson who presents today with complaints of elevated blood pressure.    Patient was seen by Dr. Roberson on     Past medical history significant for SSS s/p PPM, HTN, HLD, CKD and PRINCESS on CPAP.     Today patient states that he first started noticing that his blood pressure was becoming elevated a few weeks ago, averaging in the 150's-180's/70's-80's. He talked to a friend who is a cardiologist who recommended increasing his Irbesartan from 75 mg BID to 150 mg BID, this did improved his BP.     Otherwise states that he is feeling well. Denies any other significant concerns or complaints to discucss today.     Past Medical History:   Diagnosis Date   • Arthritis     knee/fingers bilat   • Hepatitis C    • Hypertension    • Jaundice    • Sleep apnea    • Snoring      Past Surgical History:   Procedure Laterality Date   • KNEE ARTHROSCOPY Right 4/29/2016    Procedure: KNEE ARTHROSCOPY;  Surgeon: Yesenia Grigsby M.D.;  Location: [facility];  Service:    • CHONDROPLASTY Right 4/29/2016    Procedure: CHONDROPLASTY;  Surgeon: Yesenia Grigsby M.D.;  Location: [facility];  Service:    • MENISCECTOMY Right 4/29/2016    Procedure: MENISCECTOMY - PARTIAL LATERAL;  Surgeon: Yesenia Grigsby M.D.;  Location: [facility];  Service:    • RECOVERY  12/5/2013    Performed by Cath-Recovery Surgery at SURGERY SAME DAY [facility] ORS   • PACEMAKER INSERTION  2013   • APPENDECTOMY  1996     Family History   Problem Relation Age of Onset   • Heart Disease Father    • Breast Cancer Mother    • Cancer Mother      Social History     Socioeconomic History   • Marital status:      Spouse name: Not on file   • Number of children: Not on file   • Years of education: Not on file   • Highest education level: Not on file   Occupational History   • Not on file    Social Needs   • Financial resource strain: Not on file   • Food insecurity     Worry: Not on file     Inability: Not on file   • Transportation needs     Medical: Not on file     Non-medical: Not on file   Tobacco Use   • Smoking status: Former Smoker     Packs/day: 1.50     Years: 35.00     Pack years: 52.50     Types: Cigarettes     Last attempt to quit: 1981     Years since quittin.8   • Smokeless tobacco: Never Used   Substance and Sexual Activity   • Alcohol use: No     Alcohol/week: 0.0 oz   • Drug use: No   • Sexual activity: Not on file   Lifestyle   • Physical activity     Days per week: Not on file     Minutes per session: Not on file   • Stress: Not on file   Relationships   • Social connections     Talks on phone: Not on file     Gets together: Not on file     Attends Nondenominational service: Not on file     Active member of club or organization: Not on file     Attends meetings of clubs or organizations: Not on file     Relationship status: Not on file   • Intimate partner violence     Fear of current or ex partner: Not on file     Emotionally abused: Not on file     Physically abused: Not on file     Forced sexual activity: Not on file   Other Topics Concern   • Not on file   Social History Narrative   • Not on file     Allergies   Allergen Reactions   • Amlodipine Swelling   • Ancef [Cefazolin Sodium]      N/V    Per patient No know allergies      Outpatient Encounter Medications as of 2020   Medication Sig Dispense Refill   • irbesartan (AVAPRO) 75 MG tablet Take 1 Tab by mouth 2 Times a Day. 180 Tab 3   • carvedilol (COREG) 12.5 MG Tab Take 1 Tab by mouth 2 times a day, with meals. 180 Tab 3   • ASPIRIN 81 PO Take  by mouth.     • MAGNESIUM PO Take  by mouth.     • triamterene/hctz (DYAZIDE) 37.5-25 MG Cap Take 1 Cap by mouth every morning.     • calcium carbonate (TUMS) 500 MG Chew Tab Take 500 mg by mouth every day.     • [DISCONTINUED] irbesartan (AVAPRO) 300 MG Tab      •  "[DISCONTINUED] irbesartan (AVAPRO) 75 MG tablet Take 1 Tab by mouth 2 Times a Day. 180 Tab 3   • [DISCONTINUED] carvedilol (COREG) 12.5 MG Tab Take 1 Tab by mouth 2 times a day, with meals. 180 Tab 3   • [DISCONTINUED] irbesartan (AVAPRO) 150 MG Tab Take 150 mg by mouth 2 Times a Day.     • [DISCONTINUED] carvedilol (COREG) 6.25 MG Tab Take 6.25 mg by mouth 2 times a day, with meals.       No facility-administered encounter medications on file as of 2/18/2020.      Review of Systems   Constitutional: Negative for malaise/fatigue and weight loss.   Respiratory: Negative for shortness of breath.    Cardiovascular: Negative for chest pain, palpitations, orthopnea, claudication, leg swelling and PND.   Neurological: Negative for dizziness and weakness.   All other systems reviewed and are negative.       Objective:   /68 (BP Location: Left arm, Patient Position: Sitting, BP Cuff Size: Adult)   Pulse 68   Resp 16   Ht 1.753 m (5' 9\")   Wt 103 kg (226 lb 15.4 oz)   SpO2 95%   BMI 33.52 kg/m²     Physical Exam   Constitutional: He is oriented to person, place, and time. He appears well-developed and well-nourished. No distress.   HENT:   Head: Normocephalic.   Eyes: EOM are normal.   Neck: No JVD present.   Cardiovascular: Normal rate and regular rhythm.   Pulmonary/Chest: Effort normal and breath sounds normal.   Abdominal: Soft. There is no abdominal tenderness.   Musculoskeletal:         General: No edema.   Neurological: He is alert and oriented to person, place, and time.   Skin: Skin is warm and dry.   Uncomplicated PPM site without erosion, swelling or discharge.    Psychiatric: He has a normal mood and affect. His behavior is normal.       Assessment:     1. Essential hypertension, benign  Basic Metabolic Panel   2. Essential hypertension     3. Cardiac pacemaker in situ     4. Atrioventricular block     5. CKD (chronic kidney disease) stage 3, GFR 30-59 ml/min ([facility])         Medical Decision Making:  " Today's Assessment / Status / Plan:     HTN:  -Stable.  -Discussed concern with using maximum dose of Irbesartan in the setting of his CKD. Patient agrees and would like to go back to 75 mg BID and increase his Coreg to 12.5 mg BID as recommended by Dr. Roberson.  -Continue triamterene/HCTZ 37.5-25 mg daily.   -Repeat BMP in 10-14 days.     SSS:  -S/P [employer] Dual Chamber PPM placed on 12/5/13.  -Last interrogation on 10/22/19 showed normal device functioning, 53% AP/21% with battery longevity of 6.2-7 years.   -Next device check arranged as below.     CKD:  -Repeat BMP.       Patient will follow up with myself for BP re-check as scheduled below or earlier if needed. Encouraged patient to contact office should any questions or concerns arise in the mean time. Patient understands and agrees with the plan of care.     Future Appointments   Date Time Provider Department Center   4/22/2020  3:00 PM TALHA Tucker. [facility] None   4/22/2020  3:45 PM PACER CHECK-CAM B [facility] None     Collaborating Provider: Dr. Derrell Hernandez       Please note that this dictation was created using voice recognition software. I have made every reasonable attempt to correct obvious errors, but I expect that there are errors of grammar and possibly content I did not discover before finalizing the note.

## 2020-02-19 NOTE — TELEPHONE ENCOUNTER
Patient left voice mail that his cardiologist is requesting he follow up.  Please schedule patient asap.  He is a family friend. You can let him know I will place him on my documentation time for 8am on Wednesday or Thursday to get him in sooner.  Also please have him complete overnight oximetry on current settings. Order placed

## 2020-02-28 ENCOUNTER — OFFICE VISIT (OUTPATIENT)
Dept: PULMONOLOGY | Facility: HOSPICE | Age: 70
End: 2020-02-28
Payer: COMMERCIAL

## 2020-02-28 ENCOUNTER — HOME STUDY (OUTPATIENT)
Dept: PULMONOLOGY | Facility: HOSPICE | Age: 70
End: 2020-02-28
Payer: COMMERCIAL

## 2020-02-28 VITALS
SYSTOLIC BLOOD PRESSURE: 128 MMHG | BODY MASS INDEX: 32.88 KG/M2 | WEIGHT: 222 LBS | HEIGHT: 69 IN | OXYGEN SATURATION: 95 % | HEART RATE: 60 BPM | DIASTOLIC BLOOD PRESSURE: 64 MMHG

## 2020-02-28 DIAGNOSIS — G47.33 OSA (OBSTRUCTIVE SLEEP APNEA): ICD-10-CM

## 2020-02-28 PROCEDURE — 94762 N-INVAS EAR/PLS OXIMTRY CONT: CPT | Performed by: INTERNAL MEDICINE

## 2020-02-28 PROCEDURE — 99213 OFFICE O/P EST LOW 20 MIN: CPT | Performed by: NURSE PRACTITIONER

## 2020-02-28 ASSESSMENT — FIBROSIS 4 INDEX: FIB4 SCORE: 1.21

## 2020-02-28 NOTE — PROGRESS NOTES
CC:  Here for f/u sleep issues as listed below    HPI:   Channing presents today for follow up obstructive sleep apnea.  He returns today after a 2-1/2-year hiatus.  He is [employer]. PMH of HTN, Autoimmune hepatitis, PM for second degree AV block.     His blood pressure has been higher than normal, with difficulty stabilizing until recently, prompting him to return if CPAP was working properly.    PSG from 1/2016 indicated an AHI of 51 and low oxygen of 80%.  Currently he is being treated with CPAP @ 8cmH20.  Compliance download from the dates 1/29/2020 - 2/27/2020 indicates he is wearing the device 100% for an avg of 8 hours and 51 minutes per night with a reduced AHI of 0.3. He does tolerate pressure and mask well.  He wakes up refreshed and is less tired throughout the day. They deny morning H/A. He sleeps better overall. He will continue to clean supplies weekly and change them as insurance allows.  BMI is 32.      Patient Active Problem List    Diagnosis Date Noted   • BMI 32.0-32.9,adult 02/28/2020   • Other hyperlipidemia 10/22/2019   • Cardiac pacemaker in situ 10/22/2019   • Insomnia 06/14/2017   • PRINCESS (obstructive sleep apnea) 06/14/2017   • CKD (chronic kidney disease) stage 3, GFR 30-59 ml/min ([facility]) 08/24/2016   • Right knee pain 04/29/2016   • Atrioventricular block 12/05/2013   • Renal insufficiency 08/01/2011   • Essential hypertension 05/24/2011       Past Medical History:   Diagnosis Date   • Arthritis     knee/fingers bilat   • Hepatitis C    • Hypertension    • Jaundice    • Sleep apnea    • Snoring        Past Surgical History:   Procedure Laterality Date   • KNEE ARTHROSCOPY Right 4/29/2016    Procedure: KNEE ARTHROSCOPY;  Surgeon: Yesenia Grigsby M.D.;  Location: SURGERY [facility];  Service:    • CHONDROPLASTY Right 4/29/2016    Procedure: CHONDROPLASTY;  Surgeon: Yesenia Grigsby M.D.;  Location: SURGERY [facility];  Service:    • MENISCECTOMY Right  2016    Procedure: MENISCECTOMY - PARTIAL LATERAL;  Surgeon: Yesenia Grigsby M.D.;  Location: SURGERY [facility];  Service:    • RECOVERY  2013    Performed by Cath-Recovery Surgery at SURGERY SAME DAY [facility]   • PACEMAKER INSERTION     • APPENDECTOMY         Family History   Problem Relation Age of Onset   • Heart Disease Father    • Breast Cancer Mother    • Cancer Mother        Social History     Socioeconomic History   • Marital status:      Spouse name: Not on file   • Number of children: Not on file   • Years of education: Not on file   • Highest education level: Not on file   Occupational History   • Not on file   Social Needs   • Financial resource strain: Not on file   • Food insecurity     Worry: Not on file     Inability: Not on file   • Transportation needs     Medical: Not on file     Non-medical: Not on file   Tobacco Use   • Smoking status: Former Smoker     Packs/day: 1.50     Years: 35.00     Pack years: 52.50     Types: Cigarettes     Last attempt to quit: 1981     Years since quittin.8   • Smokeless tobacco: Never Used   Substance and Sexual Activity   • Alcohol use: No     Alcohol/week: 0.0 oz   • Drug use: No   • Sexual activity: Not on file   Lifestyle   • Physical activity     Days per week: Not on file     Minutes per session: Not on file   • Stress: Not on file   Relationships   • Social connections     Talks on phone: Not on file     Gets together: Not on file     Attends Congregational service: Not on file     Active member of club or organization: Not on file     Attends meetings of clubs or organizations: Not on file     Relationship status: Not on file   • Intimate partner violence     Fear of current or ex partner: Not on file     Emotionally abused: Not on file     Physically abused: Not on file     Forced sexual activity: Not on file   Other Topics Concern   • Not on file   Social History Narrative   • Not on file       Current Outpatient  "Medications   Medication Sig Dispense Refill   • irbesartan (AVAPRO) 75 MG tablet Take 1 Tab by mouth 2 Times a Day. 180 Tab 3   • carvedilol (COREG) 12.5 MG Tab Take 1 Tab by mouth 2 times a day, with meals. 180 Tab 3   • ASPIRIN 81 PO Take  by mouth.     • MAGNESIUM PO Take  by mouth.     • triamterene/hctz (DYAZIDE) 37.5-25 MG Cap Take 1 Cap by mouth every morning.     • calcium carbonate (TUMS) 500 MG Chew Tab Take 500 mg by mouth every day.       No current facility-administered medications for this visit.           Allergies: Amlodipine and Ancef [cefazolin sodium]      ROS   Gen: Denies fever, chills, unintentional weight loss, fatigue  Resp:Denies Dyspnea  CV: Denies chest pain, chest tightness  Sleep:Denies morning headache, insomnia, daytime somnolence, snoring, gasping for air, apnea  Neuro: Denies frequent headaches, weakness, dizziness  See HPI.  All other systems reviewed and negative        Vital signs for this encounter:  Vitals:    02/28/20 1541   Height: 1.753 m (5' 9\")   Weight: 100.7 kg (222 lb)   Weight % change since last entry.: 0 %   BP: 128/64   Pulse: 60   BMI (Calculated): 32.78                   Physical Exam:   Gen:         Alert and oriented, No apparent distress.   Neck:        No Lymphadenopathy.  Lungs:     Clear to auscultation bilaterally.    CV:          Regular rate and rhythm. No murmurs, rubs or gallops.   Abd:         Soft non tender, non distended.            Ext:          No clubbing, cyanosis, edema.    Assessment   1. PRINCESS (obstructive sleep apnea)  DME Mask and Supplies   2. BMI 32.0-32.9,adult  OBESITY COUNSELING (No Charge): Patient identified as having weight management issue.  Appropriate orders and counseling given.       Patient is clinically stable and will proceed with following plan.  He is adequately being treated for his PRINCESS per his compliance.  We will complete overnight oximetry to confirm adequate oxygenation given recent history with blood pressure " instability.    PLAN:   Patient Instructions   1) Continue CPAP at 8cmH20  2) Clean mask and supplies weekly and change them as insurance allows  3) Vaccines: Up to date Pneumovax 23, Prevnar 13  4) Light conditioning and dietary changes encouraged  5) Vaccines: Up to date with Prevnar 13, Pneumovax 23, flu  6) Overnight oximetry on current settings - can call with results  7) Return in about 1 year (around 2/28/2021) for follow up with MARISSA Caban, Compliance.

## 2020-02-28 NOTE — PATIENT INSTRUCTIONS
1) Continue CPAP at 8cmH20  2) Clean mask and supplies weekly and change them as insurance allows  3) Vaccines: Up to date Pneumovax 23, Prevnar 13  4) Light conditioning and dietary changes encouraged  5) Vaccines: Up to date with Prevnar 13, Pneumovax 23, flu  6) Overnight oximetry on current settings - can call with results  7) Return in about 1 year (around 2/28/2021) for follow up with MARISSA Caban, Compliance.

## 2020-03-03 NOTE — PROCEDURES
Overnight oximetry performed on CPAP: 8 cm of water.    The basal SPO2 is 89.3%.  There were desaturations below 90% for 57% of the time, to a renee of 86%.    Interpretation:  Mild desaturations on CPAP: 8 cm of water.    Recommendations:  A CPAP titration polysomnogram is advised for accurate calibration of pressure settings.

## 2020-03-04 DIAGNOSIS — G47.33 OSA (OBSTRUCTIVE SLEEP APNEA): ICD-10-CM

## 2020-04-15 ENCOUNTER — TELEPHONE (OUTPATIENT)
Dept: CARDIOLOGY | Facility: MEDICAL CENTER | Age: 70
End: 2020-04-15

## 2020-04-15 NOTE — TELEPHONE ENCOUNTER
Called patient. He has not done lab work yet. Said he would try to get done.he will go to [facility]. Let patient know he does not need another paper order. It is in the [employer] system.

## 2020-04-16 ENCOUNTER — HOSPITAL ENCOUNTER (OUTPATIENT)
Dept: LAB | Facility: MEDICAL CENTER | Age: 70
End: 2020-04-16
Attending: NURSE PRACTITIONER
Payer: COMMERCIAL

## 2020-04-16 DIAGNOSIS — I10 ESSENTIAL HYPERTENSION, BENIGN: ICD-10-CM

## 2020-04-16 LAB
ANION GAP SERPL CALC-SCNC: 12 MMOL/L (ref 7–16)
BUN SERPL-MCNC: 35 MG/DL (ref 8–22)
CALCIUM SERPL-MCNC: 9.9 MG/DL (ref 8.5–10.5)
CHLORIDE SERPL-SCNC: 99 MMOL/L (ref 96–112)
CO2 SERPL-SCNC: 27 MMOL/L (ref 20–33)
CREAT SERPL-MCNC: 1.69 MG/DL (ref 0.5–1.4)
FASTING STATUS PATIENT QL REPORTED: NORMAL
GLUCOSE SERPL-MCNC: 102 MG/DL (ref 65–99)
POTASSIUM SERPL-SCNC: 4.7 MMOL/L (ref 3.6–5.5)
SODIUM SERPL-SCNC: 138 MMOL/L (ref 135–145)

## 2020-04-16 PROCEDURE — 36415 COLL VENOUS BLD VENIPUNCTURE: CPT

## 2020-04-16 PROCEDURE — 80048 BASIC METABOLIC PNL TOTAL CA: CPT

## 2020-04-22 ENCOUNTER — OFFICE VISIT (OUTPATIENT)
Dept: CARDIOLOGY | Facility: MEDICAL CENTER | Age: 70
End: 2020-04-22
Payer: COMMERCIAL

## 2020-04-22 ENCOUNTER — NON-PROVIDER VISIT (OUTPATIENT)
Dept: CARDIOLOGY | Facility: MEDICAL CENTER | Age: 70
End: 2020-04-22
Payer: COMMERCIAL

## 2020-04-22 VITALS
BODY MASS INDEX: 33.27 KG/M2 | WEIGHT: 224.6 LBS | DIASTOLIC BLOOD PRESSURE: 70 MMHG | OXYGEN SATURATION: 96 % | HEIGHT: 69 IN | SYSTOLIC BLOOD PRESSURE: 122 MMHG | HEART RATE: 94 BPM

## 2020-04-22 DIAGNOSIS — Z95.0 CARDIAC PACEMAKER IN SITU: ICD-10-CM

## 2020-04-22 DIAGNOSIS — E78.49 OTHER HYPERLIPIDEMIA: ICD-10-CM

## 2020-04-22 DIAGNOSIS — I44.30 ATRIOVENTRICULAR BLOCK: ICD-10-CM

## 2020-04-22 DIAGNOSIS — I10 ESSENTIAL HYPERTENSION: ICD-10-CM

## 2020-04-22 DIAGNOSIS — N18.30 CKD (CHRONIC KIDNEY DISEASE) STAGE 3, GFR 30-59 ML/MIN: ICD-10-CM

## 2020-04-22 PROCEDURE — 99214 OFFICE O/P EST MOD 30 MIN: CPT | Performed by: NURSE PRACTITIONER

## 2020-04-22 PROCEDURE — 93280 PM DEVICE PROGR EVAL DUAL: CPT | Performed by: INTERNAL MEDICINE

## 2020-04-22 RX ORDER — IRBESARTAN 150 MG/1
150 TABLET ORAL
Qty: 180 TAB | Refills: 3 | Status: SHIPPED | OUTPATIENT
Start: 2020-04-22 | End: 2021-03-30

## 2020-04-22 RX ORDER — SUMATRIPTAN 50 MG/1
TABLET, FILM COATED ORAL
COMMUNITY
Start: 2020-03-31 | End: 2020-04-22

## 2020-04-22 RX ORDER — AMLODIPINE BESYLATE 2.5 MG/1
TABLET ORAL
COMMUNITY
Start: 2020-04-07 | End: 2020-04-22 | Stop reason: SDUPTHER

## 2020-04-22 RX ORDER — AMITRIPTYLINE HYDROCHLORIDE 25 MG/1
TABLET, FILM COATED ORAL
COMMUNITY
Start: 2020-04-14 | End: 2020-04-22

## 2020-04-22 RX ORDER — AMLODIPINE BESYLATE 2.5 MG/1
2.5 TABLET ORAL 2 TIMES DAILY
Qty: 180 TAB | Refills: 3 | Status: SHIPPED | OUTPATIENT
Start: 2020-04-22 | End: 2021-04-06

## 2020-04-22 ASSESSMENT — FIBROSIS 4 INDEX: FIB4 SCORE: 1.21

## 2020-04-22 NOTE — PROGRESS NOTES
Chief Complaint   Patient presents with   • HTN (Controlled)     & Atrioventricular block   • Chronic Kidney Disease       Subjective:   Channing Grande is a 69 y.o. male patient of Dr. Jen Roberson who presents today with complaints of elevated blood pressure.    Patient was seen by my self on 2/18/20 with complaints of elevated blood pressure averaging in the (150's-180's/70's-80's). Patient had self increased his Losartan to 150 mg BID. We decided to try increasing his Coreg to 12.5 mg BID and reduce his Losartan back down to 75 mg BID due to his history of CKD. Patient was asked to follow up today for a BP and pulse check.     Past medical history significant for SSS s/p PPM, HTN, HLD, CKD and PRINCESS on CPAP and followed by Caitlyn Betancourt.     Today patient states that his BP remained elevated and was started on Amlodipine 2.5 mg daily by his PCP. There was no significant change in his BP so patient self increased the amlodipine to 2.5 mg BID and noticed his SBP improved slightly but is still averaging in the 150's. Patient recently increased his irbesartan to 150 mg BID about two days ago as recommended by a physician friend of his.     Otherwise patient is feeling well without any significant concerns or complaints.     Past Medical History:   Diagnosis Date   • Arthritis     knee/fingers bilat   • Hepatitis C    • Hypertension    • Jaundice    • Sleep apnea    • Snoring      Past Surgical History:   Procedure Laterality Date   • KNEE ARTHROSCOPY Right 4/29/2016    Procedure: KNEE ARTHROSCOPY;  Surgeon: Yesenia Grigsby M.D.;  Location: [facility];  Service:    • CHONDROPLASTY Right 4/29/2016    Procedure: CHONDROPLASTY;  Surgeon: Yesenia Grigsby M.D.;  Location: [facility];  Service:    • MENISCECTOMY Right 4/29/2016    Procedure: MENISCECTOMY - PARTIAL LATERAL;  Surgeon: Yesenia Grigsby M.D.;  Location: [facility];  Service:    • RECOVERY  12/5/2013     Performed by Cath-Recovery Surgery at [facility]   • PACEMAKER INSERTION     • APPENDECTOMY       Family History   Problem Relation Age of Onset   • Heart Disease Father    • Breast Cancer Mother    • Cancer Mother      Social History     Socioeconomic History   • Marital status:      Spouse name: Not on file   • Number of children: Not on file   • Years of education: Not on file   • Highest education level: Not on file   Occupational History   • Not on file   Social Needs   • Financial resource strain: Not on file   • Food insecurity     Worry: Not on file     Inability: Not on file   • Transportation needs     Medical: Not on file     Non-medical: Not on file   Tobacco Use   • Smoking status: Former Smoker     Packs/day: 1.50     Years: 35.00     Pack years: 52.50     Types: Cigarettes     Last attempt to quit: 1981     Years since quittin.0   • Smokeless tobacco: Never Used   Substance and Sexual Activity   • Alcohol use: No     Alcohol/week: 0.0 oz   • Drug use: No   • Sexual activity: Not on file   Lifestyle   • Physical activity     Days per week: Not on file     Minutes per session: Not on file   • Stress: Not on file   Relationships   • Social connections     Talks on phone: Not on file     Gets together: Not on file     Attends Hindu service: Not on file     Active member of club or organization: Not on file     Attends meetings of clubs or organizations: Not on file     Relationship status: Not on file   • Intimate partner violence     Fear of current or ex partner: Not on file     Emotionally abused: Not on file     Physically abused: Not on file     Forced sexual activity: Not on file   Other Topics Concern   • Not on file   Social History Narrative   • Not on file     Allergies   Allergen Reactions   • Amlodipine Swelling   • Atorvastatin      Hepatitis      Outpatient Encounter Medications as of 2020   Medication Sig Dispense Refill   • irbesartan  "(AVAPRO) 150 MG Tab Take 1 Tab by mouth 2 Times a Day. 180 Tab 3   • amLODIPine (NORVASC) 2.5 MG Tab Take 1 Tab by mouth 2 Times a Day. 180 Tab 3   • carvedilol (COREG) 12.5 MG Tab Take 1 Tab by mouth 2 times a day, with meals. 180 Tab 3   • ASPIRIN 81 PO Take  by mouth.     • MAGNESIUM PO Take  by mouth.     • triamterene/hctz (DYAZIDE) 37.5-25 MG Cap Take 1 Cap by mouth every morning.     • calcium carbonate (TUMS) 500 MG Chew Tab Take 500 mg by mouth every day.     • [DISCONTINUED] SUMAtriptan (IMITREX) 50 MG Tab      • [DISCONTINUED] amLODIPine (NORVASC) 2.5 MG Tab      • [DISCONTINUED] amitriptyline (ELAVIL) 25 MG Tab      • [DISCONTINUED] irbesartan (AVAPRO) 75 MG tablet Take 1 Tab by mouth 2 Times a Day. 180 Tab 3     No facility-administered encounter medications on file as of 4/22/2020.      Review of Systems   Constitutional: Negative for malaise/fatigue and weight loss.   Respiratory: Negative for shortness of breath.    Cardiovascular: Negative for chest pain, palpitations, orthopnea, claudication, leg swelling and PND.   Neurological: Negative for dizziness and weakness.   All other systems reviewed and are negative.       Objective:   /70 (BP Location: Left arm, Patient Position: Sitting, BP Cuff Size: Adult)   Pulse 94   Ht 1.753 m (5' 9\")   Wt 101.9 kg (224 lb 9.6 oz)   SpO2 96%   BMI 33.17 kg/m²     Physical Exam   Constitutional: He is oriented to person, place, and time. He appears well-developed and well-nourished. No distress.   HENT:   Head: Normocephalic.   Eyes: EOM are normal.   Neck: No JVD present.   Cardiovascular: Normal rate and regular rhythm.   Pulmonary/Chest: Effort normal and breath sounds normal.   Abdominal: Soft. There is no abdominal tenderness.   Musculoskeletal:         General: No edema.   Neurological: He is alert and oriented to person, place, and time.   Skin: Skin is warm and dry.   Psychiatric: He has a normal mood and affect. His behavior is normal.     NM " Cardiac Stress Test 13:    SUMMARY:  Nuclear medicine stress test with Lexiscan stress and technetium-99m  tetrofosmin demonstratin.  No infarct and no ischemia.  2.  Normal global and normal regional function, measured EF 69%.  3.  Rate-dependent left bundle-branch block.  4.  Mild systemic hypertension.  5.  No prior scan available for review and comparison.       Echocardiogram 18:  CONCLUSIONS  Mild concentric left ventricular hypertrophy.  Normal left ventricular systolic function.  Left ventricular ejection fraction is visually estimated to be 65%.  Normal diastolic function.  Pacer/ICD wire seen in right ventricle.  Normal inferior vena cava size and inspiratory collapse.  Aortic sclerosis without stenosis.     Left Ventricle  Normal left ventricular chamber size. Mild concentric left ventricular hypertrophy. Normal left ventricular systolic function. Left ventricular ejection fraction is visually estimated to be 65%. Normal regional wall motion.normal diastolic function.    Assessment:     1. Essential hypertension     2. Cardiac pacemaker in situ     3. Atrioventricular block     4. CKD (chronic kidney disease) stage 3, GFR 30-59 ml/min ([facility])     5. Other hyperlipidemia         Medical Decision Making:  Today's Assessment / Status / Plan:     HTN:  -Stable in office today.   -Continue triamterene/HCTZ 37.5-25 mg daily, Coreg 12.5 mg BID, Amlodipine 2.5 mg BID and Losartan 150 mg BID.   -Patient has a history of swelling with amlodipine, he has not notice any swelling since re-starting. He will continue to watch for swelling.     SSS:  -S/P [employer] Dual Chamber PPM placed on 13.  -Interrogation showed normal device functioning, AP 49%,  24%, no mode switching and 6-7 year battery longevity.   -Next device check due in 6 months.     HLD:  -. .  -Intolerant to statins, developed hepatitis while on statin therapy.   -Continue management with lifestyle modification.      CKD:  -Cr stable at 1.69 (appears to be baseline), GFR 40.    Patient will follow up with Dr. Jen Roberson in 6 months with PPM check or earlier if needed. Encouraged patient to contact office should any questions or concerns arise in the mean time. Patient understands and agrees with the plan of care.       Collaborating Provider: Dr. Bernardo Umanzor    Future Appointments   Date Time Provider Department Center   10/16/2020  1:45 PM PACER CHECK-[facility] None   10/16/2020  2:15 PM Jen Roberson M.D. [facility] None            Please note that this dictation was created using voice recognition software. I have made every reasonable attempt to correct obvious errors, but I expect that there are errors of grammar and possibly content I did not discover before finalizing the note.

## 2020-04-23 ASSESSMENT — ENCOUNTER SYMPTOMS
PND: 0
ORTHOPNEA: 0
WEAKNESS: 0
DIZZINESS: 0
PALPITATIONS: 0
CLAUDICATION: 0
WEIGHT LOSS: 0
SHORTNESS OF BREATH: 0

## 2020-10-16 ENCOUNTER — NON-PROVIDER VISIT (OUTPATIENT)
Dept: CARDIOLOGY | Facility: MEDICAL CENTER | Age: 70
End: 2020-10-16
Payer: COMMERCIAL

## 2020-10-16 DIAGNOSIS — Z95.0 CARDIAC PACEMAKER IN SITU: ICD-10-CM

## 2020-10-16 PROCEDURE — 93280 PM DEVICE PROGR EVAL DUAL: CPT | Performed by: INTERNAL MEDICINE

## 2020-11-03 ENCOUNTER — TELEPHONE (OUTPATIENT)
Dept: CARDIOLOGY | Facility: MEDICAL CENTER | Age: 70
End: 2020-11-03

## 2020-11-03 NOTE — TELEPHONE ENCOUNTER
"LVM for pt to cb to discuss MRI compatibility with PPM.  Per Ulcia with [facility]): \"Pt can receive an MRI with non-conditional protocol. His lead system is compatible, not the can hence the use of the non-conditional protocol.\"  LVM. JH  "

## 2021-01-15 DIAGNOSIS — Z23 NEED FOR VACCINATION: ICD-10-CM

## 2021-01-19 DIAGNOSIS — I10 ESSENTIAL HYPERTENSION: ICD-10-CM

## 2021-01-21 RX ORDER — CARVEDILOL 12.5 MG/1
TABLET ORAL
Qty: 180 TAB | Refills: 0 | Status: SHIPPED | OUTPATIENT
Start: 2021-01-21 | End: 2021-04-20

## 2021-04-21 ENCOUNTER — NON-PROVIDER VISIT (OUTPATIENT)
Dept: CARDIOLOGY | Facility: MEDICAL CENTER | Age: 71
End: 2021-04-21
Payer: COMMERCIAL

## 2021-04-21 DIAGNOSIS — Z95.0 CARDIAC PACEMAKER IN SITU: ICD-10-CM

## 2021-04-21 PROCEDURE — 93280 PM DEVICE PROGR EVAL DUAL: CPT | Performed by: INTERNAL MEDICINE

## 2021-06-17 ENCOUNTER — OFFICE VISIT (OUTPATIENT)
Dept: CARDIOLOGY | Facility: MEDICAL CENTER | Age: 71
End: 2021-06-17
Payer: COMMERCIAL

## 2021-06-17 VITALS
HEART RATE: 61 BPM | SYSTOLIC BLOOD PRESSURE: 120 MMHG | HEIGHT: 69 IN | OXYGEN SATURATION: 96 % | RESPIRATION RATE: 19 BRPM | DIASTOLIC BLOOD PRESSURE: 64 MMHG | BODY MASS INDEX: 34.36 KG/M2 | WEIGHT: 232 LBS

## 2021-06-17 DIAGNOSIS — R60.0 LOWER EXTREMITY EDEMA: ICD-10-CM

## 2021-06-17 DIAGNOSIS — I10 ESSENTIAL HYPERTENSION: ICD-10-CM

## 2021-06-17 DIAGNOSIS — Z86.19 HISTORY OF HEPATITIS: ICD-10-CM

## 2021-06-17 PROCEDURE — 99214 OFFICE O/P EST MOD 30 MIN: CPT | Performed by: INTERNAL MEDICINE

## 2021-06-17 RX ORDER — ALLOPURINOL 300 MG/1
300 TABLET ORAL DAILY
COMMUNITY
Start: 2021-06-04

## 2021-06-17 RX ORDER — COLCHICINE 0.6 MG/1
TABLET ORAL
COMMUNITY
Start: 2021-05-07 | End: 2021-06-17

## 2021-06-17 RX ORDER — DOXAZOSIN MESYLATE 1 MG/1
1 TABLET ORAL DAILY
Qty: 30 TABLET | Refills: 11 | Status: SHIPPED | OUTPATIENT
Start: 2021-06-17 | End: 2022-01-04 | Stop reason: SDUPTHER

## 2021-06-17 ASSESSMENT — ENCOUNTER SYMPTOMS
SHORTNESS OF BREATH: 0
LOSS OF CONSCIOUSNESS: 0
FALLS: 0
DIZZINESS: 0
PND: 0
DEPRESSION: 0
ABDOMINAL PAIN: 0
ORTHOPNEA: 0
PALPITATIONS: 0

## 2021-06-17 NOTE — LETTER
[facility]   [address] [place] [address]  [place] [address]  Phone: [phone]  Fax: [phone]              Channing Grande  1950    Encounter Date: 6/17/2021    Jen Roberson M.D.          PROGRESS NOTE:  Chief Complaint   Patient presents with   • Hypertension     Subjective:   Channing Grande is a 70-year-old male presenting to clinic for follow-up on hypertension.    Pertinent history  History of sick sinus syndrome status post permanent pacemaker.   Hypertension  Hyperlipidemia. Intolerant to statins. Developed hepatitis while on statins about 20 years ago.  CKD  Obstructive sleep apnea on CPAP.      Patient was seen in our clinic about a year ago for uncontrolled hypertension and he was started on amlodipine 2.5 mg twice daily.  Since being on amlodipine, his blood pressure has been well controlled.  However he has noticed bilateral lower extremity edema that worsens towards the end of the day.  He has also gained 8 pounds in the same duration.  He denies any orthopnea or PND.  Reports dyspnea if he walks long durations.    Past Medical History:   Diagnosis Date   • Arthritis     knee/fingers bilat   • Hepatitis C    • Hypertension    • Jaundice    • Sleep apnea    • Snoring      Past Surgical History:   Procedure Laterality Date   • KNEE ARTHROSCOPY Right 4/29/2016    Procedure: KNEE ARTHROSCOPY;  Surgeon: Yesenia Grigsby M.D.;  Location: [facility];  Service:    • CHONDROPLASTY Right 4/29/2016    Procedure: CHONDROPLASTY;  Surgeon: Yesenia Grigsby M.D.;  Location: [facility];  Service:    • MENISCECTOMY Right 4/29/2016    Procedure: MENISCECTOMY - PARTIAL LATERAL;  Surgeon: Yesenia Grigsby M.D.;  Location: [facility];  Service:    • RECOVERY  12/5/2013    Performed by Cath-Recovery Surgery at SURGERY SAME DAY [facility]   • PACEMAKER INSERTION  2013   • APPENDECTOMY  1996     Family  History   Problem Relation Age of Onset   • Heart Disease Father    • Breast Cancer Mother    • Cancer Mother      Social History     Socioeconomic History   • Marital status:      Spouse name: Not on file   • Number of children: Not on file   • Years of education: Not on file   • Highest education level: Not on file   Occupational History   • Not on file   Tobacco Use   • Smoking status: Former Smoker     Packs/day: 1.50     Years: 35.00     Pack years: 52.50     Types: Cigarettes     Quit date: 1981     Years since quittin.1   • Smokeless tobacco: Never Used   Substance and Sexual Activity   • Alcohol use: No     Alcohol/week: 0.0 oz   • Drug use: No   • Sexual activity: Not on file   Other Topics Concern   • Not on file   Social History Narrative   • Not on file     Social Determinants of Health     Financial Resource Strain:    • Difficulty of Paying Living Expenses:    Food Insecurity:    • Worried About Running Out of Food in the Last Year:    • Ran Out of Food in the Last Year:    Transportation Needs:    • Lack of Transportation (Medical):    • Lack of Transportation (Non-Medical):    Physical Activity:    • Days of Exercise per Week:    • Minutes of Exercise per Session:    Stress:    • Feeling of Stress :    Social Connections:    • Frequency of Communication with Friends and Family:    • Frequency of Social Gatherings with Friends and Family:    • Attends [employer] Services:    • Active Member of Clubs or Organizations:    • Attends Club or Organization Meetings:    • Marital Status:    Intimate Partner Violence:    • Fear of Current or Ex-Partner:    • Emotionally Abused:    • Physically Abused:    • Sexually Abused:      Allergies   Allergen Reactions   • Amlodipine Swelling   • Atorvastatin      Hepatitis      Outpatient Encounter Medications as of 2021   Medication Sig Dispense Refill   • allopurinol (ZYLOPRIM) 300 MG Tab      • doxazosin (CARDURA) 1 MG Tab Take 1 tablet by mouth  "every day. 30 tablet 11   • carvedilol (COREG) 12.5 MG Tab Take 1 tablet by mouth 2 times a day with meals. Please call [phone] to schedule a follow up appointment. Thank you. 180 tablet 0   • irbesartan (AVAPRO) 150 MG Tab Take 1 tablet by mouth 2 Times a Day. Please call [phone] and schedule a follow up appointment for further refills. Thank you! 180 tablet 0   • MAGNESIUM PO Take  by mouth.     • triamterene/hctz (DYAZIDE) 37.5-25 MG Cap Take 1 Cap by mouth every morning.     • calcium carbonate (TUMS) 500 MG Chew Tab Take 500 mg by mouth every day.     • [DISCONTINUED] colchicine (COLCRYS) 0.6 MG Tab  (Patient not taking: Reported on 6/17/2021)     • [DISCONTINUED] amLODIPine (NORVASC) 2.5 MG Tab Take 1 tablet by mouth 2 times a day. Please call [phone] and schedule a follow up appointment for further refills. Thank you! 180 tablet 0   • [DISCONTINUED] ASPIRIN 81 PO Take  by mouth. (Patient not taking: Reported on 6/17/2021)       No facility-administered encounter medications on file as of 6/17/2021.     Review of Systems   Constitutional: Negative for malaise/fatigue.   Respiratory: Negative for shortness of breath.    Cardiovascular: Positive for leg swelling. Negative for chest pain, palpitations, orthopnea and PND.   Gastrointestinal: Negative for abdominal pain.   Musculoskeletal: Negative for falls.   Neurological: Negative for dizziness and loss of consciousness.   Psychiatric/Behavioral: Negative for depression.   All other systems reviewed and are negative.       Objective:   /64 (BP Location: Left arm, Patient Position: Sitting, BP Cuff Size: Adult)   Pulse 61   Resp 19   Ht 1.753 m (5' 9\")   Wt 105 kg (232 lb)   SpO2 96%   BMI 34.26 kg/m²     Physical Exam   Constitutional: He is oriented to person, place, and time. He appears well-developed. No distress.   HENT:   Head: Normocephalic and atraumatic.   Eyes: Conjunctivae are normal. No scleral icterus.   Cardiovascular: " Normal rate, regular rhythm and normal heart sounds. Exam reveals no gallop and no friction rub.   No murmur heard.  Pulmonary/Chest: Effort normal and breath sounds normal. No respiratory distress. He has no wheezes. He has no rales.   Musculoskeletal:         General: Swelling (Trace bilateral pitting edema.) present.      Cervical back: Normal range of motion and neck supple.   Neurological: He is alert and oriented to person, place, and time.   Skin: Skin is warm and dry. He is not diaphoretic.   Psychiatric: His behavior is normal.   Nursing note and vitals reviewed.      Assessment:     1. Essential hypertension     2. Lower extremity edema  EC-ECHOCARDIOGRAM COMPLETE W/O CONT   3. History of hepatitis  HEPATIC FUNCTION PANEL       Medical Decision Making:  Today's Assessment / Status / Plan:     Patient's lower extremity edema is likely iatrogenic from amlodipine.  Will discontinue today.  If his lower extremity edema fails to resolve despite discontinuation of this medication, he will get an echocardiogram which we have referred him for today.    Hypertension is well controlled today.  Initial plan was to start him on hydralazine to substitute amlodipine.  However patient reports having a history of hepatitis thought to be secondary to statins 20 years ago.  He is worried about recurrent hepatitis with medications.  Lexicomp was reviewed.  Hydralazine has a known side effect of hepatitis, unknown frequency.  Given this information, I am hesitant to start hydralazine.  Instead will start doxazosin 1 mg daily.  Doxazosin can affect liver function test in less than 1% of the patients.  Repeat LFTs will be ordered to be done in the next month or so.    Return to clinic in 3 months or earlier if needed.    Thank you for allowing me to participate in the care of this patient. Please do not hesitate to contact me with any questions.    Jen Roberson MD  Cardiologist  [facility]      PLEASE NOTE: This dictation was created using voice recognition software.               Yannick Rodríguez M.D.  [address] [facility] [address]  [address]  [place] [address]  Via Fax: [phone]

## 2021-06-17 NOTE — PROGRESS NOTES
Chief Complaint   Patient presents with   • Hypertension     Subjective:   Channing Grande is a 70-year-old male presenting to clinic for follow-up on hypertension.    Pertinent history  History of sick sinus syndrome status post permanent pacemaker.   Hypertension  Hyperlipidemia. Intolerant to statins. Developed hepatitis while on statins about 20 years ago.  CKD  Obstructive sleep apnea on CPAP.      Patient was seen in our clinic about a year ago for uncontrolled hypertension and he was started on amlodipine 2.5 mg twice daily.  Since being on amlodipine, his blood pressure has been well controlled.  However he has noticed bilateral lower extremity edema that worsens towards the end of the day.  He has also gained 8 pounds in the same duration.  He denies any orthopnea or PND.  Reports dyspnea if he walks long durations.    Past Medical History:   Diagnosis Date   • Arthritis     knee/fingers bilat   • Hepatitis C    • Hypertension    • Jaundice    • Sleep apnea    • Snoring      Past Surgical History:   Procedure Laterality Date   • KNEE ARTHROSCOPY Right 4/29/2016    Procedure: KNEE ARTHROSCOPY;  Surgeon: Yesenia Grigsby M.D.;  Location: [facility];  Service:    • CHONDROPLASTY Right 4/29/2016    Procedure: CHONDROPLASTY;  Surgeon: Yesenia Grigsby M.D.;  Location: [facility];  Service:    • MENISCECTOMY Right 4/29/2016    Procedure: MENISCECTOMY - PARTIAL LATERAL;  Surgeon: Yesenia Grigsby M.D.;  Location: [facility];  Service:    • RECOVERY  12/5/2013    Performed by Cath-Recovery Surgery at SURGERY SAME DAY [facility] ORS   • PACEMAKER INSERTION  2013   • APPENDECTOMY  1996     Family History   Problem Relation Age of Onset   • Heart Disease Father    • Breast Cancer Mother    • Cancer Mother      Social History     Socioeconomic History   • Marital status:      Spouse name: Not on file   • Number of children: Not on file   • Years of education:  Not on file   • Highest education level: Not on file   Occupational History   • Not on file   Tobacco Use   • Smoking status: Former Smoker     Packs/day: 1.50     Years: 35.00     Pack years: 52.50     Types: Cigarettes     Quit date: 1981     Years since quittin.1   • Smokeless tobacco: Never Used   Substance and Sexual Activity   • Alcohol use: No     Alcohol/week: 0.0 oz   • Drug use: No   • Sexual activity: Not on file   Other Topics Concern   • Not on file   Social History Narrative   • Not on file     Social Determinants of Health     Financial Resource Strain:    • Difficulty of Paying Living Expenses:    Food Insecurity:    • Worried About Running Out of Food in the Last Year:    • Ran Out of Food in the Last Year:    Transportation Needs:    • Lack of Transportation (Medical):    • Lack of Transportation (Non-Medical):    Physical Activity:    • Days of Exercise per Week:    • Minutes of Exercise per Session:    Stress:    • Feeling of Stress :    Social Connections:    • Frequency of Communication with Friends and Family:    • Frequency of Social Gatherings with Friends and Family:    • Attends Muslim Services:    • Active Member of Clubs or Organizations:    • Attends Club or Organization Meetings:    • Marital Status:    Intimate Partner Violence:    • Fear of Current or Ex-Partner:    • Emotionally Abused:    • Physically Abused:    • Sexually Abused:      Allergies   Allergen Reactions   • Amlodipine Swelling   • Atorvastatin      Hepatitis      Outpatient Encounter Medications as of 2021   Medication Sig Dispense Refill   • allopurinol (ZYLOPRIM) 300 MG Tab      • doxazosin (CARDURA) 1 MG Tab Take 1 tablet by mouth every day. 30 tablet 11   • carvedilol (COREG) 12.5 MG Tab Take 1 tablet by mouth 2 times a day with meals. Please call [phone] to schedule a follow up appointment. Thank you. 180 tablet 0   • irbesartan (AVAPRO) 150 MG Tab Take 1 tablet by mouth 2 Times a Day.  "Please call [phone] and schedule a follow up appointment for further refills. Thank you! 180 tablet 0   • MAGNESIUM PO Take  by mouth.     • triamterene/hctz (DYAZIDE) 37.5-25 MG Cap Take 1 Cap by mouth every morning.     • calcium carbonate (TUMS) 500 MG Chew Tab Take 500 mg by mouth every day.     • [DISCONTINUED] colchicine (COLCRYS) 0.6 MG Tab  (Patient not taking: Reported on 6/17/2021)     • [DISCONTINUED] amLODIPine (NORVASC) 2.5 MG Tab Take 1 tablet by mouth 2 times a day. Please call [phone] and schedule a follow up appointment for further refills. Thank you! 180 tablet 0   • [DISCONTINUED] ASPIRIN 81 PO Take  by mouth. (Patient not taking: Reported on 6/17/2021)       No facility-administered encounter medications on file as of 6/17/2021.     Review of Systems   Constitutional: Negative for malaise/fatigue.   Respiratory: Negative for shortness of breath.    Cardiovascular: Positive for leg swelling. Negative for chest pain, palpitations, orthopnea and PND.   Gastrointestinal: Negative for abdominal pain.   Musculoskeletal: Negative for falls.   Neurological: Negative for dizziness and loss of consciousness.   Psychiatric/Behavioral: Negative for depression.   All other systems reviewed and are negative.       Objective:   /64 (BP Location: Left arm, Patient Position: Sitting, BP Cuff Size: Adult)   Pulse 61   Resp 19   Ht 1.753 m (5' 9\")   Wt 105 kg (232 lb)   SpO2 96%   BMI 34.26 kg/m²     Physical Exam   Constitutional: He is oriented to person, place, and time. He appears well-developed. No distress.   HENT:   Head: Normocephalic and atraumatic.   Eyes: Conjunctivae are normal. No scleral icterus.   Cardiovascular: Normal rate, regular rhythm and normal heart sounds. Exam reveals no gallop and no friction rub.   No murmur heard.  Pulmonary/Chest: Effort normal and breath sounds normal. No respiratory distress. He has no wheezes. He has no rales.   Musculoskeletal:         General: " Swelling (Trace bilateral pitting edema.) present.      Cervical back: Normal range of motion and neck supple.   Neurological: He is alert and oriented to person, place, and time.   Skin: Skin is warm and dry. He is not diaphoretic.   Psychiatric: His behavior is normal.   Nursing note and vitals reviewed.      Assessment:     1. Essential hypertension     2. Lower extremity edema  EC-ECHOCARDIOGRAM COMPLETE W/O CONT   3. History of hepatitis  HEPATIC FUNCTION PANEL       Medical Decision Making:  Today's Assessment / Status / Plan:     Patient's lower extremity edema is likely iatrogenic from amlodipine.  Will discontinue today.  If his lower extremity edema fails to resolve despite discontinuation of this medication, he will get an echocardiogram which we have referred him for today.    Hypertension is well controlled today.  Initial plan was to start him on hydralazine to substitute amlodipine.  However patient reports having a history of hepatitis thought to be secondary to statins 20 years ago.  He is worried about recurrent hepatitis with medications.  Lexicomp was reviewed.  Hydralazine has a known side effect of hepatitis, unknown frequency.  Given this information, I am hesitant to start hydralazine.  Instead will start doxazosin 1 mg daily.  Doxazosin can affect liver function test in less than 1% of the patients.  Repeat LFTs will be ordered to be done in the next month or so.    Return to clinic in 3 months or earlier if needed.    Thank you for allowing me to participate in the care of this patient. Please do not hesitate to contact me with any questions.    Jen Roberson MD  Cardiologist  [facility]      PLEASE NOTE: This dictation was created using voice recognition software.

## 2021-06-27 DIAGNOSIS — I10 ESSENTIAL HYPERTENSION, BENIGN: ICD-10-CM

## 2021-06-28 RX ORDER — IRBESARTAN 150 MG/1
TABLET ORAL
Qty: 180 TABLET | Refills: 3 | Status: SHIPPED | OUTPATIENT
Start: 2021-06-28 | End: 2022-06-23

## 2021-07-18 DIAGNOSIS — I10 ESSENTIAL HYPERTENSION: ICD-10-CM

## 2021-07-20 RX ORDER — CARVEDILOL 12.5 MG/1
12.5 TABLET ORAL 2 TIMES DAILY WITH MEALS
Qty: 180 TABLET | Refills: 3 | Status: SHIPPED | OUTPATIENT
Start: 2021-07-20 | End: 2022-12-25 | Stop reason: SDUPTHER

## 2021-08-30 ENCOUNTER — APPOINTMENT (OUTPATIENT)
Dept: CARDIOLOGY | Facility: MEDICAL CENTER | Age: 71
End: 2021-08-30
Attending: INTERNAL MEDICINE
Payer: COMMERCIAL

## 2021-09-21 ENCOUNTER — TELEPHONE (OUTPATIENT)
Dept: CARDIOLOGY | Facility: MEDICAL CENTER | Age: 71
End: 2021-09-21

## 2021-09-21 NOTE — TELEPHONE ENCOUNTER
Called patient. He did not get echo done. He canceled it. Did not get lab work done either. I mailed him the lab slip. He said he goes to [facility].

## 2021-10-22 ENCOUNTER — NON-PROVIDER VISIT (OUTPATIENT)
Dept: CARDIOLOGY | Facility: MEDICAL CENTER | Age: 71
End: 2021-10-22
Payer: COMMERCIAL

## 2021-10-22 DIAGNOSIS — I44.30 ATRIOVENTRICULAR BLOCK: ICD-10-CM

## 2021-10-22 DIAGNOSIS — Z95.0 CARDIAC PACEMAKER IN SITU: ICD-10-CM

## 2021-10-22 PROCEDURE — 93280 PM DEVICE PROGR EVAL DUAL: CPT | Performed by: INTERNAL MEDICINE

## 2021-12-01 ENCOUNTER — OFFICE VISIT (OUTPATIENT)
Dept: CARDIOLOGY | Facility: MEDICAL CENTER | Age: 71
End: 2021-12-01
Payer: COMMERCIAL

## 2021-12-01 VITALS
HEIGHT: 69 IN | WEIGHT: 230 LBS | HEART RATE: 60 BPM | BODY MASS INDEX: 34.07 KG/M2 | SYSTOLIC BLOOD PRESSURE: 130 MMHG | DIASTOLIC BLOOD PRESSURE: 58 MMHG | OXYGEN SATURATION: 95 %

## 2021-12-01 DIAGNOSIS — N18.30 STAGE 3 CHRONIC KIDNEY DISEASE, UNSPECIFIED WHETHER STAGE 3A OR 3B CKD: ICD-10-CM

## 2021-12-01 DIAGNOSIS — I10 ESSENTIAL HYPERTENSION: ICD-10-CM

## 2021-12-01 DIAGNOSIS — Z95.0 CARDIAC PACEMAKER IN SITU: ICD-10-CM

## 2021-12-01 PROCEDURE — 99214 OFFICE O/P EST MOD 30 MIN: CPT | Performed by: INTERNAL MEDICINE

## 2021-12-01 NOTE — PROGRESS NOTES
Chief Complaint   Patient presents with   • HTN (Controlled)     follow up / prev. pt. of Da Ryder       Subjective     Bryon Grande is a 71 y.o. male who presents today for initial visit in follow-up of sick sinus syndrome status post permanent pacemaker placement hypertension and hyperlipidemia with statin intolerance due to hepatitis.  Previous patient of Dr. Roberson who is left the practice.  He is a [place]  currently practicing.  Feels well and has no significant symptoms.    Past Medical History:   Diagnosis Date   • Arthritis     knee/fingers bilat   • Hepatitis C    • Hypertension    • Jaundice    • Sleep apnea    • Snoring      Past Surgical History:   Procedure Laterality Date   • KNEE ARTHROSCOPY Right 2016    Procedure: KNEE ARTHROSCOPY;  Surgeon: Yesenia Grigsby M.D.;  Location: SURGERY [facility];  Service:    • CHONDROPLASTY Right 2016    Procedure: CHONDROPLASTY;  Surgeon: Yesenia Grigsby M.D.;  Location: [facility];  Service:    • MENISCECTOMY Right 2016    Procedure: MENISCECTOMY - PARTIAL LATERAL;  Surgeon: Yesenia Grigsby M.D.;  Location: SURGERY [facility];  Service:    • RECOVERY  2013    Performed by Cath-Recovery Surgery at SURGERY SAME DAY [facility] ORS   • PACEMAKER INSERTION     • APPENDECTOMY       Family History   Problem Relation Age of Onset   • Heart Disease Father    • Breast Cancer Mother    • Cancer Mother      Social History     Socioeconomic History   • Marital status:      Spouse name: Not on file   • Number of children: Not on file   • Years of education: Not on file   • Highest education level: Not on file   Occupational History   • Not on file   Tobacco Use   • Smoking status: Former Smoker     Packs/day: 1.50     Years: 35.00     Pack years: 52.50     Types: Cigarettes     Quit date: 1981     Years since quittin.6   • Smokeless tobacco: Never Used   Substance and  Sexual Activity   • Alcohol use: No     Alcohol/week: 0.0 oz   • Drug use: No   • Sexual activity: Not on file   Other Topics Concern   • Not on file   Social History Narrative   • Not on file     Social Determinants of Health     Financial Resource Strain:    • Difficulty of Paying Living Expenses: Not on file   Food Insecurity:    • Worried About Running Out of Food in the Last Year: Not on file   • Ran Out of Food in the Last Year: Not on file   Transportation Needs:    • Lack of Transportation (Medical): Not on file   • Lack of Transportation (Non-Medical): Not on file   Physical Activity:    • Days of Exercise per Week: Not on file   • Minutes of Exercise per Session: Not on file   Stress:    • Feeling of Stress : Not on file   Social Connections:    • Frequency of Communication with Friends and Family: Not on file   • Frequency of Social Gatherings with Friends and Family: Not on file   • Attends Lutheran Services: Not on file   • Active Member of Clubs or Organizations: Not on file   • Attends Club or Organization Meetings: Not on file   • Marital Status: Not on file   Intimate Partner Violence:    • Fear of Current or Ex-Partner: Not on file   • Emotionally Abused: Not on file   • Physically Abused: Not on file   • Sexually Abused: Not on file   Housing Stability:    • Unable to Pay for Housing in the Last Year: Not on file   • Number of Places Lived in the Last Year: Not on file   • Unstable Housing in the Last Year: Not on file     Allergies   Allergen Reactions   • Amlodipine Swelling   • Atorvastatin      Hepatitis      Outpatient Encounter Medications as of 12/1/2021   Medication Sig Dispense Refill   • carvedilol (COREG) 12.5 MG Tab Take 1 tablet by mouth 2 times a day with meals. 180 tablet 3   • irbesartan (AVAPRO) 150 MG Tab TAKE 1 TABLET TWICE A DAY (NEED A FOLLOW UP APPOINTMENT FOR FURTHER REFILLS. PLEASE CALL [phone] AND SCHEDULE) 180 tablet 3   • allopurinol (ZYLOPRIM) 300 MG Tab      •  "doxazosin (CARDURA) 1 MG Tab Take 1 tablet by mouth every day. 30 tablet 11   • MAGNESIUM PO Take  by mouth.     • triamterene/hctz (DYAZIDE) 37.5-25 MG Cap Take 1 Cap by mouth every morning.     • [DISCONTINUED] calcium carbonate (TUMS) 500 MG Chew Tab Take 500 mg by mouth every day.       No facility-administered encounter medications on file as of 12/1/2021.     Review of Systems   All other systems reviewed and are negative.             Objective     /58 (BP Location: Left arm, Patient Position: Sitting)   Pulse 60   Ht 1.753 m (5' 9\")   Wt 104 kg (230 lb)   SpO2 95%   BMI 33.97 kg/m²     Physical Exam  Vitals reviewed.   Constitutional:       General: He is not in acute distress.     Appearance: He is well-developed. He is not diaphoretic.   HENT:      Head: Normocephalic and atraumatic.      Right Ear: External ear normal.      Left Ear: External ear normal.   Eyes:      General: No scleral icterus.        Right eye: No discharge.         Left eye: No discharge.      Conjunctiva/sclera: Conjunctivae normal.      Pupils: Pupils are equal, round, and reactive to light.   Neck:      Thyroid: No thyromegaly.      Vascular: No JVD.      Trachea: No tracheal deviation.   Cardiovascular:      Rate and Rhythm: Normal rate and regular rhythm.      Chest Wall: PMI is not displaced.      Pulses:           Carotid pulses are 2+ on the right side and 2+ on the left side.       Radial pulses are 2+ on the left side.        Popliteal pulses are 2+ on the right side and 2+ on the left side.        Dorsalis pedis pulses are 2+ on the right side and 2+ on the left side.        Posterior tibial pulses are 2+ on the right side and 2+ on the left side.      Heart sounds: No murmur heard.  No friction rub. No gallop.    Pulmonary:      Effort: Pulmonary effort is normal. No respiratory distress.      Breath sounds: Normal breath sounds. No wheezing or rales.   Chest:      Chest wall: No tenderness.   Abdominal:      " General: Bowel sounds are normal. There is no distension.      Palpations: Abdomen is soft.      Tenderness: There is no abdominal tenderness.   Musculoskeletal:         General: No tenderness or deformity. Normal range of motion.      Cervical back: Normal range of motion and neck supple.   Skin:     General: Skin is warm and dry.      Coloration: Skin is not pale.      Findings: No erythema or rash.   Neurological:      Mental Status: He is alert and oriented to person, place, and time.      Cranial Nerves: No cranial nerve deficit (cranial nerves II through XII grossly intact).      Coordination: Coordination normal.   Psychiatric:         Behavior: Behavior normal.         Thought Content: Thought content normal.       LABS:  Lab Results   Component Value Date/Time    CHOLSTRLTOT 184 08/21/2019 07:34 AM     (H) 08/21/2019 07:34 AM    HDL 33 (A) 08/21/2019 07:34 AM    TRIGLYCERIDE 94 08/21/2019 07:34 AM       Lab Results   Component Value Date/Time    WBC 6.4 06/29/2018 09:14 AM    RBC 4.88 06/29/2018 09:14 AM    HEMOGLOBIN 14.4 06/29/2018 09:14 AM    HEMATOCRIT 42.0 06/29/2018 09:14 AM    MCV 86 06/29/2018 09:14 AM     Lab Results   Component Value Date/Time    SODIUM 138 04/16/2020 02:29 PM    POTASSIUM 4.7 04/16/2020 02:29 PM    CHLORIDE 99 04/16/2020 02:29 PM    CO2 27 04/16/2020 02:29 PM    GLUCOSE 102 (H) 04/16/2020 02:29 PM    BUN 35 (H) 04/16/2020 02:29 PM    CREATININE 1.69 (H) 04/16/2020 02:29 PM    BUNCREATRAT 18 06/29/2018 09:14 AM         Lab Results   Component Value Date/Time    ALKPHOSPHAT 57 08/21/2019 07:34 AM    ASTSGOT 13 08/21/2019 07:34 AM    ALTSGPT 12 08/21/2019 07:34 AM    TBILIRUBIN 0.8 08/21/2019 07:34 AM      No results found for: BNPBTYPENAT   No results found for: TSH  No results found for: PROTHROMBTM, INR                Assessment & Plan     1. Cardiac pacemaker in situ     2. Essential hypertension  CT-CARDIAC SCORING   3. Stage 3 chronic kidney disease, unspecified whether  stage 3a or 3b CKD (HCC)  CT-CARDIAC SCORING       Medical Decision Making: Today's Assessment/Status/Plan:          Overall doing well with good blood pressure control and good medical therapy.  Intolerant to statins due to severe acute hepatitis.  Inappropriate to try another see if this happens again but rather due to his poor lipid profile I would consider PCSK9 inhibitor therapy.  In order to refine the benefit to him personally I recommend CT coronary calcium scoring and if abnormal initiation of therapy.  If normal continuing diet and exercise would be reasonable.  We will follow-up after testing.

## 2021-12-31 ENCOUNTER — PATIENT MESSAGE (OUTPATIENT)
Dept: CARDIOLOGY | Facility: MEDICAL CENTER | Age: 71
End: 2021-12-31

## 2021-12-31 ENCOUNTER — HOSPITAL ENCOUNTER (OUTPATIENT)
Dept: RADIOLOGY | Facility: MEDICAL CENTER | Age: 71
End: 2021-12-31
Attending: INTERNAL MEDICINE
Payer: COMMERCIAL

## 2021-12-31 DIAGNOSIS — I10 ESSENTIAL HYPERTENSION: ICD-10-CM

## 2021-12-31 DIAGNOSIS — N18.30 STAGE 3 CHRONIC KIDNEY DISEASE, UNSPECIFIED WHETHER STAGE 3A OR 3B CKD: ICD-10-CM

## 2021-12-31 PROCEDURE — 4410556 CT-CARDIAC SCORING (SELF PAY ONLY)

## 2022-01-03 DIAGNOSIS — I10 ESSENTIAL HYPERTENSION: ICD-10-CM

## 2022-01-03 RX ORDER — DOXAZOSIN MESYLATE 1 MG/1
1 TABLET ORAL DAILY
Qty: 180 TABLET | Refills: 3 | Status: CANCELLED | OUTPATIENT
Start: 2022-01-03

## 2022-01-04 ENCOUNTER — PATIENT MESSAGE (OUTPATIENT)
Dept: CARDIOLOGY | Facility: MEDICAL CENTER | Age: 72
End: 2022-01-04

## 2022-01-04 DIAGNOSIS — I10 ESSENTIAL HYPERTENSION: ICD-10-CM

## 2022-01-04 DIAGNOSIS — E78.49 OTHER HYPERLIPIDEMIA: ICD-10-CM

## 2022-01-04 DIAGNOSIS — I44.30 ATRIOVENTRICULAR BLOCK: ICD-10-CM

## 2022-01-04 DIAGNOSIS — Z95.0 CARDIAC PACEMAKER IN SITU: ICD-10-CM

## 2022-01-04 DIAGNOSIS — R93.1 ABNORMAL SCREENING CARDIAC CT: ICD-10-CM

## 2022-01-04 DIAGNOSIS — I10 ESSENTIAL HYPERTENSION, BENIGN: ICD-10-CM

## 2022-01-04 DIAGNOSIS — Z91.89 OTHER SPECIFIED PERSONAL RISK FACTORS, NOT ELSEWHERE CLASSIFIED: ICD-10-CM

## 2022-01-04 RX ORDER — DOXAZOSIN MESYLATE 1 MG/1
1 TABLET ORAL DAILY
Qty: 90 TABLET | Refills: 3 | Status: SHIPPED | OUTPATIENT
Start: 2022-01-04 | End: 2022-12-22

## 2022-01-04 NOTE — PATIENT COMMUNICATION
Sohail Ortiz M.D.  You 26 minutes ago (2:17 PM)     Calcium score is very abnormal. Above 400, a stress test is generally recommended. Due to his history I would recommend we sart PCSK9I Rx with Praulent or Repatha depending on which is more affodable for him. I also recommend a stress test ([facility]) if hes up for it.     Thanks      Jessika reply sent to pt.

## 2022-01-05 ENCOUNTER — PATIENT MESSAGE (OUTPATIENT)
Dept: CARDIOLOGY | Facility: MEDICAL CENTER | Age: 72
End: 2022-01-05

## 2022-01-05 DIAGNOSIS — E78.49 OTHER HYPERLIPIDEMIA: ICD-10-CM

## 2022-01-05 DIAGNOSIS — R93.1 ABNORMAL SCREENING CARDIAC CT: ICD-10-CM

## 2022-01-06 DIAGNOSIS — E78.49 OTHER HYPERLIPIDEMIA: ICD-10-CM

## 2022-01-06 DIAGNOSIS — R93.1 ABNORMAL SCREENING CARDIAC CT: ICD-10-CM

## 2022-01-07 ENCOUNTER — TELEPHONE (OUTPATIENT)
Dept: CARDIOLOGY | Facility: MEDICAL CENTER | Age: 72
End: 2022-01-07

## 2022-01-07 DIAGNOSIS — E78.49 OTHER HYPERLIPIDEMIA: ICD-10-CM

## 2022-01-07 DIAGNOSIS — R93.1 ABNORMAL SCREENING CARDIAC CT: ICD-10-CM

## 2022-01-07 NOTE — TELEPHONE ENCOUNTER
The pharmacy claim for Praluent 75MG/ML auto-injectors has been rejected by insurance.  Non-preferred medications may have a higher patient co-pay than the health insurance plan's preferred medications.  Using available formulary data, we believe that the following medications may be covered.  Drug Name  PA Requirement  Rosuvastatin Calcium Tier 1 NOT Required*  Ezetimibe Tier 1 NOT Required*  Atorvastatin Calcium Tier 1 NOT Required*  Repatha SureClick Tier 2, ST Required*  Repatha Tier 2, ST Required*

## 2022-01-11 ENCOUNTER — TELEPHONE (OUTPATIENT)
Dept: CARDIOLOGY | Facility: MEDICAL CENTER | Age: 72
End: 2022-01-11

## 2022-01-11 ENCOUNTER — PATIENT MESSAGE (OUTPATIENT)
Dept: CARDIOLOGY | Facility: MEDICAL CENTER | Age: 72
End: 2022-01-11

## 2022-01-11 DIAGNOSIS — R93.1 ABNORMAL SCREENING CARDIAC CT: ICD-10-CM

## 2022-01-11 DIAGNOSIS — E78.49 OTHER HYPERLIPIDEMIA: ICD-10-CM

## 2022-01-11 NOTE — TELEPHONE ENCOUNTER
Received notice from Madeleine GONZALES. Pt's insurance requiring Lipid Profile for PA processing. Noted pt's last Lipid Profile 08/21/2019, no recent labs in media. Lipid Profile order placed per routine. quitchen message sent to pt.

## 2022-01-11 NOTE — PATIENT COMMUNICATION
[phone], spoke with Robert. Confirmed pt had lab results (including Lipid Profile) completed 08/15/21. Results requested to be sent to nurses station, fax number given to Robert. Will await results.

## 2022-01-12 NOTE — TELEPHONE ENCOUNTER
GOPI GAMBOA (Romo: [id number])  Rx #: [id number]  Repatha SureClick 140MG/ML auto-injectors     Form  [employer] Electronic PA Form (2017 NCPDP)  Created  4 days ago  Sent to Plan  3 minutes ago  Plan Response  3 minutes ago  Submit Clinical Questions  less than a minute ago  Determination  Favorable  less than a minute ago  Message from Plan  CaseId:[id number];Status:Approved;Review Type:Prior Auth;Coverage Start Date:12/13/2021;Coverage End Date:01/12/2023;

## 2022-02-01 ENCOUNTER — TELEPHONE (OUTPATIENT)
Dept: CARDIOLOGY | Facility: MEDICAL CENTER | Age: 72
End: 2022-02-01

## 2022-02-02 NOTE — TELEPHONE ENCOUNTER
ROBBIN Ferguson, the preadmit nurse from [facility], called in and wanted to know if the pt is pacemaker dependent.      Best contact for [facility]:  PH: [phone] and ask for preadmit  Fax: [phone]      Thank you,  Lawanda MCMAHON

## 2022-04-20 ENCOUNTER — OFFICE VISIT (OUTPATIENT)
Dept: CARDIOLOGY | Facility: MEDICAL CENTER | Age: 72
End: 2022-04-20
Payer: COMMERCIAL

## 2022-04-20 ENCOUNTER — NON-PROVIDER VISIT (OUTPATIENT)
Dept: CARDIOLOGY | Facility: MEDICAL CENTER | Age: 72
End: 2022-04-20

## 2022-04-20 VITALS
DIASTOLIC BLOOD PRESSURE: 58 MMHG | OXYGEN SATURATION: 97 % | HEIGHT: 69 IN | WEIGHT: 226 LBS | SYSTOLIC BLOOD PRESSURE: 106 MMHG | BODY MASS INDEX: 33.47 KG/M2 | HEART RATE: 60 BPM | RESPIRATION RATE: 16 BRPM

## 2022-04-20 DIAGNOSIS — I10 ESSENTIAL HYPERTENSION: ICD-10-CM

## 2022-04-20 DIAGNOSIS — Z95.0 CARDIAC PACEMAKER IN SITU: ICD-10-CM

## 2022-04-20 DIAGNOSIS — E78.49 OTHER HYPERLIPIDEMIA: ICD-10-CM

## 2022-04-20 DIAGNOSIS — R93.1 ABNORMAL SCREENING CARDIAC CT: ICD-10-CM

## 2022-04-20 DIAGNOSIS — I44.30 ATRIOVENTRICULAR BLOCK: ICD-10-CM

## 2022-04-20 PROCEDURE — 93280 PM DEVICE PROGR EVAL DUAL: CPT | Performed by: INTERNAL MEDICINE

## 2022-04-20 PROCEDURE — 99213 OFFICE O/P EST LOW 20 MIN: CPT | Performed by: INTERNAL MEDICINE

## 2022-04-20 NOTE — PROGRESS NOTES
Chief Complaint   Patient presents with   • HTN (Controlled)       Subjective     Bryon Grande is a 71 y.o. male who presents today for initial fu of sick sinus syndrome status post permanent pacemaker placement hypertension and hyperlipidemia with statin intolerance due to hepatitis.   He is a [place]  currently practicing.  Feels well and has no significant symptoms.    In the interim his coronary calcium score returned very abnormal. Subsequent stress testing at [facility] is reportedly normal. Was prescribed PCSK9I Rx but wanted to discuss prior to initiation.    Past Medical History:   Diagnosis Date   • Arthritis     knee/fingers bilat   • Hepatitis C    • Hypertension    • Jaundice    • Sleep apnea    • Snoring      Past Surgical History:   Procedure Laterality Date   • KNEE ARTHROSCOPY Right 4/29/2016    Procedure: KNEE ARTHROSCOPY;  Surgeon: Yesenia Grigsby M.D.;  Location: SURGERY [facility];  Service:    • CHONDROPLASTY Right 4/29/2016    Procedure: CHONDROPLASTY;  Surgeon: Yesenia Grigsby M.D.;  Location: [facility];  Service:    • MENISCECTOMY Right 4/29/2016    Procedure: MENISCECTOMY - PARTIAL LATERAL;  Surgeon: Yesenia Grigsby M.D.;  Location: SURGERY [facility];  Service:    • RECOVERY  12/5/2013    Performed by Cath-Recovery Surgery at SURGERY SAME DAY [facility] ORS   • PACEMAKER INSERTION  2013   • APPENDECTOMY  1996     Family History   Problem Relation Age of Onset   • Heart Disease Father    • Breast Cancer Mother    • Cancer Mother      Social History     Socioeconomic History   • Marital status:      Spouse name: Not on file   • Number of children: Not on file   • Years of education: Not on file   • Highest education level: Not on file   Occupational History   • Not on file   Tobacco Use   • Smoking status: Former Smoker     Packs/day: 1.50     Years: 35.00     Pack years: 52.50     Types: Cigarettes     Quit  "date: 1981     Years since quittin.0   • Smokeless tobacco: Never Used   Substance and Sexual Activity   • Alcohol use: No     Alcohol/week: 0.0 oz   • Drug use: No   • Sexual activity: Not on file   Other Topics Concern   • Not on file   Social History Narrative   • Not on file     Social Determinants of Health     Financial Resource Strain: Not on file   Food Insecurity: Not on file   Transportation Needs: Not on file   Physical Activity: Not on file   Stress: Not on file   Social Connections: Not on file   Intimate Partner Violence: Not on file   Housing Stability: Not on file     Allergies   Allergen Reactions   • Amlodipine Swelling   • Atorvastatin      Hepatitis      Outpatient Encounter Medications as of 2022   Medication Sig Dispense Refill   • doxazosin (CARDURA) 1 MG Tab Take 1 Tablet by mouth every day. 90 Tablet 3   • carvedilol (COREG) 12.5 MG Tab Take 1 tablet by mouth 2 times a day with meals. 180 tablet 3   • irbesartan (AVAPRO) 150 MG Tab TAKE 1 TABLET TWICE A DAY (NEED A FOLLOW UP APPOINTMENT FOR FURTHER REFILLS. PLEASE CALL [phone] AND SCHEDULE) 180 tablet 3   • allopurinol (ZYLOPRIM) 300 MG Tab      • MAGNESIUM PO Take  by mouth.     • triamterene/hctz (MAXZIDE-25/DYAZIDE) 37.5-25 MG Cap Take 1 Cap by mouth every morning.     • Evolocumab, REPATHA, 140 MG/ML Solution Auto-injector Inject 1 Each under the skin every 14 days. 6 Each 0     No facility-administered encounter medications on file as of 2022.     Review of Systems   All other systems reviewed and are negative.             Objective     /58 (BP Location: Left arm, Patient Position: Sitting)   Pulse 60   Resp 16   Ht 1.753 m (5' 9\")   Wt 103 kg (226 lb)   SpO2 97%   BMI 33.37 kg/m²     Physical Exam  Vitals reviewed.   Constitutional:       General: He is not in acute distress.     Appearance: He is well-developed. He is not diaphoretic.   HENT:      Head: Normocephalic and atraumatic.      Right Ear: " External ear normal.      Left Ear: External ear normal.   Eyes:      General: No scleral icterus.        Right eye: No discharge.         Left eye: No discharge.      Conjunctiva/sclera: Conjunctivae normal.      Pupils: Pupils are equal, round, and reactive to light.   Neck:      Thyroid: No thyromegaly.      Vascular: No JVD.      Trachea: No tracheal deviation.   Cardiovascular:      Rate and Rhythm: Normal rate and regular rhythm.      Chest Wall: PMI is not displaced.      Pulses:           Carotid pulses are 2+ on the right side and 2+ on the left side.       Radial pulses are 2+ on the left side.        Popliteal pulses are 2+ on the right side and 2+ on the left side.        Dorsalis pedis pulses are 2+ on the right side and 2+ on the left side.        Posterior tibial pulses are 2+ on the right side and 2+ on the left side.      Heart sounds: No murmur heard.    No friction rub. No gallop.   Pulmonary:      Effort: Pulmonary effort is normal. No respiratory distress.      Breath sounds: Normal breath sounds. No wheezing or rales.   Chest:      Chest wall: No tenderness.   Abdominal:      General: Bowel sounds are normal. There is no distension.      Palpations: Abdomen is soft.      Tenderness: There is no abdominal tenderness.   Musculoskeletal:         General: No tenderness or deformity. Normal range of motion.      Cervical back: Normal range of motion and neck supple.   Skin:     General: Skin is warm and dry.      Coloration: Skin is not pale.      Findings: No erythema or rash.   Neurological:      Mental Status: He is alert and oriented to person, place, and time.      Cranial Nerves: No cranial nerve deficit (cranial nerves II through XII grossly intact).      Coordination: Coordination normal.   Psychiatric:         Behavior: Behavior normal.         Thought Content: Thought content normal.       LABS:  Lab Results   Component Value Date/Time    [facility]     (H)  08/21/2019 07:34 AM    HDL 33 (A) 08/21/2019 07:34 AM    TRIGLYCERIDE 94 08/21/2019 07:34 AM       Lab Results   Component Value Date/Time    WBC 6.4 06/29/2018 09:14 AM    RBC 4.88 06/29/2018 09:14 AM    HEMOGLOBIN 14.4 06/29/2018 09:14 AM    HEMATOCRIT 42.0 06/29/2018 09:14 AM    MCV 86 06/29/2018 09:14 AM     Lab Results   Component Value Date/Time    SODIUM 138 04/16/2020 02:29 PM    POTASSIUM 4.7 04/16/2020 02:29 PM    CHLORIDE 99 04/16/2020 02:29 PM    CO2 27 04/16/2020 02:29 PM    GLUCOSE 102 (H) 04/16/2020 02:29 PM    BUN 35 (H) 04/16/2020 02:29 PM    CREATININE 1.69 (H) 04/16/2020 02:29 PM    BUNCREATRAT 18 06/29/2018 09:14 AM         Lab Results   Component Value Date/Time    ALKPHOSPHAT 57 08/21/2019 07:34 AM    ASTSGOT 13 08/21/2019 07:34 AM    ALTSGPT 12 08/21/2019 07:34 AM    TBILIRUBIN 0.8 08/21/2019 07:34 AM      No results found for: BNPBTYPENAT   No results found for: TSH  No results found for: PROTHROMBTM, INR     CT coronary calcium score (12/31/2021):  LMA - 0.0  LCX - 160.0  LAD - 1379.7  RCA - 791.2  Total Calcium Score: 2330.8  Percentile: Calcium score is above the 90th percentile for the patient's age and sex.              Assessment & Plan     1. Atrioventricular block     2. Other hyperlipidemia  Comp Metabolic Panel   3. Abnormal screening cardiac CT  Comp Metabolic Panel   4. Essential hypertension     5. Cardiac pacemaker in situ         Medical Decision Making: Today's Assessment/Status/Plan:          Overall doing well with good blood pressure control and good medical therapy.  Intolerant to statins due to severe acute hepatitis.  Inappropriate to try another see if this happens again but rather due to his poor lipid profile I would consider PCSK9 inhibitor therapy. Discussed his testing results at length and PCSK9 Rx. He will initiate for goal LDL<70. Will check frequent LFTs after initiation until stable dosing for a few months.    FU3-6M.

## 2022-06-13 ENCOUNTER — TELEPHONE (OUTPATIENT)
Dept: CARDIOLOGY | Facility: MEDICAL CENTER | Age: 72
End: 2022-06-13
Payer: COMMERCIAL

## 2022-06-13 NOTE — TELEPHONE ENCOUNTER
Called [facility] and requested lab results from 06/09/22.  Medical records request sent to [facility] for stress test results, received receipt for confirmation.  Will await results.

## 2022-06-13 NOTE — TELEPHONE ENCOUNTER
TW    Caller: Channing Grande  Topic/issue: He would like a call back to discuss Repatha.   He has also completed blood test on 06- with [facility] and they were sent  to Dr. Betancourt and he would like you to get the results.  Pt also had a Stress test at [facility] and wants to see if you have received those results?  Callback Number: [phone] (home)     Thank you  Dyan MELGAR

## 2022-06-14 NOTE — TELEPHONE ENCOUNTER
Lab results not yet received. Re-sent request to [facility] medical records, received receipt for confirmation.

## 2022-06-15 NOTE — TELEPHONE ENCOUNTER
Lipid profile looks much better. LDL less than 70. Continue repatha. CMP overall looks good with renal function improved GFR 36 from 29. Liver function is  Normal.

## 2022-06-15 NOTE — TELEPHONE ENCOUNTER
Called [facility] and was transferred to medical records. Rep is located in [place] who are temporarily covering all medical records requests from [facility]. She said if fax was sent, they most likely have it in a queue for processing. She apologized for the delay of response. She recommended if records are needed urgently, the pt is requested to go to hospital physically to obtain his records.    Lab results received from [facility], scanned through [employer] and will have ADD/ADA review while TW out of office.

## 2022-06-16 ENCOUNTER — PATIENT MESSAGE (OUTPATIENT)
Dept: CARDIOLOGY | Facility: MEDICAL CENTER | Age: 72
End: 2022-06-16
Payer: COMMERCIAL

## 2022-06-16 NOTE — PATIENT COMMUNICATION
Called Dr. Otto's office, [facility]. Most recent stress test results that they have is from February 2022. Requested results to be faxed to our office. Will await results.

## 2022-06-16 NOTE — TELEPHONE ENCOUNTER
Attempted to call pt to discuss, mailbox full and unable to leave a message. [employer] message sent.

## 2022-06-17 DIAGNOSIS — E78.49 OTHER HYPERLIPIDEMIA: ICD-10-CM

## 2022-06-17 DIAGNOSIS — R93.1 ABNORMAL SCREENING CARDIAC CT: ICD-10-CM

## 2022-06-22 ENCOUNTER — HOSPITAL ENCOUNTER (OUTPATIENT)
Dept: RADIOLOGY | Facility: MEDICAL CENTER | Age: 72
End: 2022-06-22
Attending: INTERNAL MEDICINE
Payer: COMMERCIAL

## 2022-06-22 DIAGNOSIS — K75.4 HEPATITIS, AUTOIMMUNE (HCC): ICD-10-CM

## 2022-06-22 DIAGNOSIS — K76.9 CHRONIC LIVER DISEASE: ICD-10-CM

## 2022-06-22 PROCEDURE — 76700 US EXAM ABDOM COMPLETE: CPT

## 2022-07-15 ENCOUNTER — TELEPHONE (OUTPATIENT)
Dept: CARDIOLOGY | Facility: MEDICAL CENTER | Age: 72
End: 2022-07-15
Payer: COMMERCIAL

## 2022-07-15 NOTE — TELEPHONE ENCOUNTER
TW      Caller: Channing Grande    Medication Name and Dosage: Evolocumab, REPATHA, 140 MG/ML Solution Auto-injector   Did patient contact pharmacy (If no, please call pharmacy first): Yes  Medication amount left: 0  Pref[facility] Pharmacy[facility] [address] [facility]  Other qustions (Topic): n/a  Callback Number (Will only call for issues): [phone] (cell)       Thank you    Thank you

## 2022-10-11 ENCOUNTER — NON-PROVIDER VISIT (OUTPATIENT)
Dept: CARDIOLOGY | Facility: MEDICAL CENTER | Age: 72
End: 2022-10-11
Payer: COMMERCIAL

## 2022-10-11 ENCOUNTER — OFFICE VISIT (OUTPATIENT)
Dept: CARDIOLOGY | Facility: MEDICAL CENTER | Age: 72
End: 2022-10-11
Payer: COMMERCIAL

## 2022-10-11 VITALS
WEIGHT: 227 LBS | HEART RATE: 60 BPM | SYSTOLIC BLOOD PRESSURE: 122 MMHG | RESPIRATION RATE: 14 BRPM | HEIGHT: 69 IN | DIASTOLIC BLOOD PRESSURE: 50 MMHG | OXYGEN SATURATION: 96 % | BODY MASS INDEX: 33.62 KG/M2

## 2022-10-11 DIAGNOSIS — Z95.0 CARDIAC PACEMAKER IN SITU: ICD-10-CM

## 2022-10-11 DIAGNOSIS — N18.30 STAGE 3 CHRONIC KIDNEY DISEASE, UNSPECIFIED WHETHER STAGE 3A OR 3B CKD: ICD-10-CM

## 2022-10-11 DIAGNOSIS — I44.30 ATRIOVENTRICULAR BLOCK: ICD-10-CM

## 2022-10-11 DIAGNOSIS — R60.0 LOWER EXTREMITY EDEMA: ICD-10-CM

## 2022-10-11 DIAGNOSIS — I10 ESSENTIAL HYPERTENSION: ICD-10-CM

## 2022-10-11 DIAGNOSIS — N18.30 CKD (CHRONIC KIDNEY DISEASE) STAGE 3, GFR 30-59 ML/MIN: ICD-10-CM

## 2022-10-11 DIAGNOSIS — R60.0 LEG EDEMA: ICD-10-CM

## 2022-10-11 PROCEDURE — 93280 PM DEVICE PROGR EVAL DUAL: CPT | Performed by: INTERNAL MEDICINE

## 2022-10-11 PROCEDURE — 99214 OFFICE O/P EST MOD 30 MIN: CPT | Performed by: INTERNAL MEDICINE

## 2022-10-11 RX ORDER — CHLORHEXIDINE GLUCONATE ORAL RINSE 1.2 MG/ML
SOLUTION DENTAL
COMMUNITY
Start: 2022-08-16 | End: 2023-06-19

## 2022-10-11 RX ORDER — IBUPROFEN 600 MG/1
TABLET ORAL
COMMUNITY
Start: 2022-08-16 | End: 2023-06-19

## 2022-10-11 RX ORDER — AMOXICILLIN 500 MG/1
CAPSULE ORAL
COMMUNITY
Start: 2022-08-16 | End: 2023-06-19

## 2022-10-11 RX ORDER — TRIAMTERENE AND HYDROCHLOROTHIAZIDE 37.5; 25 MG/1; MG/1
1 CAPSULE ORAL EVERY MORNING
Qty: 90 CAPSULE | Refills: 3 | Status: SHIPPED | OUTPATIENT
Start: 2022-10-11

## 2022-10-11 NOTE — PROGRESS NOTES
Chief Complaint   Patient presents with    HTN (Controlled)     Follow up         Subjective     Bryon Grande is a 71 y.o. male who presents today for fu of sick sinus syndrome status post permanent pacemaker placement hypertension and hyperlipidemia with statin intolerance due to hepatitis as well as severe coronary artery calcification that is asymptomatic.   He is a [place]  currently practicing.  Feels well and has no significant symptoms.    Since last visit he has initiated PCSK9 inhibitor therapy with no adverse effects and an excellent response with regard to his lipid profile.  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  Active and feeling well.  Does have some brawny skin changes of both ankles but does not endorse significant edema.    Past Medical History:   Diagnosis Date    Arthritis     knee/fingers bilat    Hepatitis C     Hypertension     Jaundice     Sleep apnea     Snoring      Past Surgical History:   Procedure Laterality Date    KNEE ARTHROSCOPY Right 4/29/2016    Procedure: KNEE ARTHROSCOPY;  Surgeon: Yesenia Grigsby M.D.;  Location: [facility];  Service:     CHONDROPLASTY Right 4/29/2016    Procedure: CHONDROPLASTY;  Surgeon: Yesenia Grigsby M.D.;  Location: [facility];  Service:     MENISCECTOMY Right 4/29/2016    Procedure: MENISCECTOMY - PARTIAL LATERAL;  Surgeon: Yesenia Grigsby M.D.;  Location: [facility];  Service:     RECOVERY  12/5/2013    Performed by Cath-Recovery Surgery at SURGERY SAME DAY [facility] ORS    PACEMAKER INSERTION  2013    APPENDECTOMY  1996     Family History   Problem Relation Age of Onset    Heart Disease Father     Breast Cancer Mother     Cancer Mother      Social History     Socioeconomic History    Marital status:      Spouse name: Not on file    Number of children: Not on file    Years of education: Not on  file    Highest education level: Not on file   Occupational History    Not on file   Tobacco Use    Smoking status: Former     Packs/day: 1.50     Years: 35.00     Pack years: 52.50     Types: Cigarettes     Quit date: 1981     Years since quittin.4    Smokeless tobacco: Never   Substance and Sexual Activity    Alcohol use: No     Alcohol/week: 0.0 oz    Drug use: No    Sexual activity: Not on file   Other Topics Concern    Not on file   Social History Narrative    Not on file     Social Determinants of Health     Financial Resource Strain: Not on file   Food Insecurity: Not on file   Transportation Needs: Not on file   Physical Activity: Not on file   Stress: Not on file   Social Connections: Not on file   Intimate Partner Violence: Not on file   Housing Stability: Not on file     Allergies   Allergen Reactions    Amlodipine Swelling    Atorvastatin      Hepatitis      Outpatient Encounter Medications as of 10/11/2022   Medication Sig Dispense Refill    triamterene/hctz (MAXZIDE-25/DYAZIDE) 37.5-25 MG Cap Take 1 Capsule by mouth every morning. 90 Capsule 3    REPATHA SURECLICK 140 MG/ML Solution Auto-injector INJECT 1 ML UNDER THE SKIN EVERY 14 DAYS 6 Each 3    irbesartan (AVAPRO) 150 MG Tab Take 1 Tablet by mouth 2 times a day. 180 Tablet 3    doxazosin (CARDURA) 1 MG Tab Take 1 Tablet by mouth every day. 90 Tablet 3    carvedilol (COREG) 12.5 MG Tab Take 1 tablet by mouth 2 times a day with meals. 180 tablet 3    allopurinol (ZYLOPRIM) 300 MG Tab       MAGNESIUM PO Take  by mouth.      amoxicillin (AMOXIL) 500 MG Cap  (Patient not taking: Reported on 10/11/2022)      chlorhexidine (PERIDEX) 0.12 % Solution  (Patient not taking: Reported on 10/11/2022)      ibuprofen (MOTRIN) 600 MG Tab  (Patient not taking: Reported on 10/11/2022)      [DISCONTINUED] triamterene/hctz (MAXZIDE-25/DYAZIDE) 37.5-25 MG Cap Take 1 Cap by mouth every morning.       No facility-administered encounter medications on file as of  "10/11/2022.     Review of Systems   All other systems reviewed and are negative.           Objective     /50 (BP Location: Left arm, Patient Position: Sitting)   Pulse 60   Resp 14   Ht 1.753 m (5' 9\")   Wt 103 kg (227 lb)   SpO2 96%   BMI 33.52 kg/m²     Physical Exam  Vitals reviewed.   Constitutional:       General: He is not in acute distress.     Appearance: He is well-developed. He is not diaphoretic.   HENT:      Head: Normocephalic and atraumatic.      Right Ear: External ear normal.      Left Ear: External ear normal.   Eyes:      General: No scleral icterus.        Right eye: No discharge.         Left eye: No discharge.      Conjunctiva/sclera: Conjunctivae normal.      Pupils: Pupils are equal, round, and reactive to light.   Neck:      Thyroid: No thyromegaly.      Vascular: No JVD.      Trachea: No tracheal deviation.   Cardiovascular:      Rate and Rhythm: Normal rate and regular rhythm.      Chest Wall: PMI is not displaced.      Pulses:           Carotid pulses are 2+ on the right side and 2+ on the left side.       Radial pulses are 2+ on the left side.        Popliteal pulses are 2+ on the right side and 2+ on the left side.        Dorsalis pedis pulses are 2+ on the right side and 2+ on the left side.        Posterior tibial pulses are 2+ on the right side and 2+ on the left side.      Heart sounds: No murmur heard.    No friction rub. No gallop.   Pulmonary:      Effort: Pulmonary effort is normal. No respiratory distress.      Breath sounds: Normal breath sounds. No wheezing or rales.   Chest:      Chest wall: No tenderness.   Abdominal:      General: Bowel sounds are normal. There is no distension.      Palpations: Abdomen is soft.      Tenderness: There is no abdominal tenderness.   Musculoskeletal:         General: No tenderness or deformity. Normal range of motion.      Cervical back: Normal range of motion and neck supple.   Skin:     General: Skin is warm and dry.      " Coloration: Skin is not pale.      Findings: No erythema or rash.   Neurological:      Mental Status: He is alert and oriented to person, place, and time.      Cranial Nerves: No cranial nerve deficit (cranial nerves II through XII grossly intact).      Coordination: Coordination normal.   Psychiatric:         Behavior: Behavior normal.         Thought Content: Thought content normal.     LABS:  Lab Results   Component Value Date/Time    CHOLSTRLTOT 184 08/21/2019 07:34 AM     (H) 08/21/2019 07:34 AM    HDL 33 (A) 08/21/2019 07:34 AM    TRIGLYCERIDE 94 08/21/2019 07:34 AM       Lab Results   Component Value Date/Time    WBC 6.4 06/29/2018 09:14 AM    RBC 4.88 06/29/2018 09:14 AM    HEMOGLOBIN 14.4 06/29/2018 09:14 AM    HEMATOCRIT 42.0 06/29/2018 09:14 AM    MCV 86 06/29/2018 09:14 AM     Lab Results   Component Value Date/Time    SODIUM 138 04/16/2020 02:29 PM    POTASSIUM 4.7 04/16/2020 02:29 PM    CHLORIDE 99 04/16/2020 02:29 PM    CO2 27 04/16/2020 02:29 PM    GLUCOSE 102 (H) 04/16/2020 02:29 PM    BUN 35 (H) 04/16/2020 02:29 PM    CREATININE 1.69 (H) 04/16/2020 02:29 PM    BUNCREATRAT 18 06/29/2018 09:14 AM         Lab Results   Component Value Date/Time    ALKPHOSPHAT 57 08/21/2019 07:34 AM    ASTSGOT 13 08/21/2019 07:34 AM    ALTSGPT 12 08/21/2019 07:34 AM    TBILIRUBIN 0.8 08/21/2019 07:34 AM      No results found for: BNPBTYPENAT   No results found for: TSH  No results found for: PROTHROMBTM, INR     CT coronary calcium score (12/31/2021):  LMA - 0.0  LCX - 160.0  LAD - 1379.7  RCA - 791.2  Total Calcium Score: 2330.8  Percentile: Calcium score is above the 90th percentile for the patient's age and sex.              Assessment & Plan     1. Essential hypertension  triamterene/hctz (MAXZIDE-25/DYAZIDE) 37.5-25 MG Cap      2. CKD (chronic kidney disease) stage 3, GFR 30-59 ml/min ([facility])  triamterene/hctz (MAXZIDE-25/DYAZIDE) 37.5-25 MG Cap      3. Leg edema  EC-ECHOCARDIOGRAM COMPLETE W/O CONT      4.  Stage 3 chronic kidney disease, unspecified whether stage 3a or 3b CKD (HCC)        5. Lower extremity edema        6. Cardiac pacemaker in situ            Medical Decision Making: Today's Assessment/Status/Plan:          Overall doing well with good blood pressure control and good medical therapy.  Intolerant to statins due to severe acute hepatitis.  But tolerating PCSK9 inhibitor well with excellent lipid profile.  LDL is very suppressed and I would not recommend further suppression of his LDL however.  We discussed a goal LDL below 70 but above 25-30 would be the optimal range.  Continue baby aspirin.  Medications refilled.  Recommend echocardiogram for his skin changes in his legs if normal then would consider venous reflux ultrasound studies if he develops symptomatic edema and otherwise compression garments and increase physical activity was discussed with his upcoming nursing home.     Follow-up 6 months

## 2022-10-27 ENCOUNTER — PATIENT MESSAGE (OUTPATIENT)
Dept: CARDIOLOGY | Facility: MEDICAL CENTER | Age: 72
End: 2022-10-27
Payer: COMMERCIAL

## 2022-10-27 NOTE — PATIENT COMMUNICATION
To Dr. Ortiz, pt was prescribed triamterene-HCTZ on 10/11/22. Please advise on alternative. Thank you!

## 2022-11-02 DIAGNOSIS — R93.1 ABNORMAL SCREENING CARDIAC CT: ICD-10-CM

## 2022-11-02 DIAGNOSIS — E78.49 OTHER HYPERLIPIDEMIA: ICD-10-CM

## 2022-11-02 RX ORDER — EVOLOCUMAB 140 MG/ML
INJECTION, SOLUTION SUBCUTANEOUS
Qty: 6 EACH | Refills: 3 | Status: SHIPPED | OUTPATIENT
Start: 2022-11-02

## 2022-12-15 ENCOUNTER — TELEPHONE (OUTPATIENT)
Dept: CARDIOLOGY | Facility: MEDICAL CENTER | Age: 72
End: 2022-12-15
Payer: COMMERCIAL

## 2022-12-15 NOTE — TELEPHONE ENCOUNTER
"PA started    GOPI GAMBOA (Key: [id number])  Repatha SureClick 140MG/ML auto-injectors     Form  [employer] Electronic PA Form (2017 NCPDP)  Created  2 days ago  Sent to Plan  Determination  Request Not Sent  This PA has been accessed, but not sent to the plan. Please fill out the required fields below and click \"Send to Plan.\"  "

## 2022-12-15 NOTE — TELEPHONE ENCOUNTER
PA sent to plan    GOPI GAMBOA (Key: [id number])  Repatha SureClick 140MG/ML auto-injectors     Form  [employer] Electronic PA Form (2017 NCPDP)  Created  2 days ago  Sent to Plan  5 minutes ago  Plan Response  5 minutes ago  Submit Clinical Questions  less than a minute ago  Determination

## 2023-01-03 ENCOUNTER — TELEPHONE (OUTPATIENT)
Dept: CARDIOLOGY | Facility: MEDICAL CENTER | Age: 73
End: 2023-01-03

## 2023-01-03 NOTE — TELEPHONE ENCOUNTER
TW        Caller: Channing Grande      Medication Name and Dosage: doxazosin (CARDURA) 1 MG Tab        Please call your pharmacy and have them send us a refill request or speak to a live representative, RX number may have changed.    Medication amount left: 1 week    Preferred Pharmacy: [facility] - [place], [place] - [facility]    Other questions (Topic): n/a    Callback Number (Will only call for issues): [phone]        Thank you    -Ayaz PRESTON

## 2023-01-31 ENCOUNTER — TELEPHONE (OUTPATIENT)
Dept: CARDIOLOGY | Facility: MEDICAL CENTER | Age: 73
End: 2023-01-31
Payer: COMMERCIAL

## 2023-04-03 ENCOUNTER — TELEPHONE (OUTPATIENT)
Dept: CARDIOLOGY | Facility: MEDICAL CENTER | Age: 73
End: 2023-04-03
Payer: MEDICARE

## 2023-04-03 NOTE — TELEPHONE ENCOUNTER
TW    Caller: Channing Grande    Topic/issue: REQUESTING CALL BACK    Patient states that [employer] sent a fax requesting authorization. He wanted to make us aware of this so we can fax it back within 48hrs. Please advise.    Thank you,  Yovany MOSER    Callback Number: [phone] (home)

## 2023-04-03 NOTE — TELEPHONE ENCOUNTER
Attempted to return patient's call, left message. No fax noted in media or received by this RN. Will await fax.

## 2023-04-05 ENCOUNTER — TELEPHONE (OUTPATIENT)
Dept: CARDIOLOGY | Facility: MEDICAL CENTER | Age: 73
End: 2023-04-05
Payer: MEDICARE

## 2023-04-05 NOTE — TELEPHONE ENCOUNTER
TW    Caller: Channing Grande    Topic/issue: RETURNING RN CALL    Callback Number: [phone] (cell)

## 2023-04-05 NOTE — TELEPHONE ENCOUNTER
PA request receive, questions answered and faxed back. Confirmation received and scanned into [employer].

## 2023-04-05 NOTE — TELEPHONE ENCOUNTER
Spoke to patient who said his [employer] insurance told him they faxed us a PA request for Irbesartan (Avapro). They will only cover 150mg (one tablet) daily not 300 mg (2 tablets) daily without a PA. Need PA for 2 pills (300mg) daily. No paperwork noted in chart, none received by this RN.   Phone number given for PA is: [phone].   Patient says this has to be done ASAP or they will deny coverage for this medication.      CHRISTIAN Johnson: Can you please look into this for patient? Thanks!

## 2023-04-05 NOTE — TELEPHONE ENCOUNTER
PA questions received via fax, questions answered, faxed back to [facility], confirmation received and scanned into [employer].

## 2023-04-05 NOTE — TELEPHONE ENCOUNTER
Attempted to call patient to advise of below, left message    Coordinators: If patient calls back, please advise paperwork has been completed and faxed back.

## 2023-05-19 ENCOUNTER — TELEPHONE (OUTPATIENT)
Dept: CARDIOLOGY | Facility: MEDICAL CENTER | Age: 73
End: 2023-05-19

## 2023-05-19 ENCOUNTER — NON-PROVIDER VISIT (OUTPATIENT)
Dept: CARDIOLOGY | Facility: MEDICAL CENTER | Age: 73
End: 2023-05-19
Attending: INTERNAL MEDICINE
Payer: MEDICARE

## 2023-05-19 DIAGNOSIS — I44.30 AV BLOCK: ICD-10-CM

## 2023-05-19 DIAGNOSIS — Z95.0 CARDIAC PACEMAKER IN SITU: ICD-10-CM

## 2023-05-19 PROCEDURE — 93280 PM DEVICE PROGR EVAL DUAL: CPT | Mod: 26 | Performed by: INTERNAL MEDICINE

## 2023-05-19 PROCEDURE — 93280 PM DEVICE PROGR EVAL DUAL: CPT | Performed by: INTERNAL MEDICINE

## 2023-05-19 NOTE — TELEPHONE ENCOUNTER
Patients device @ replacement time.  Please contact patient to schedule for gen change.  Patient has dual chamber [employer] pacemaker- can be done 2-3 months.

## 2023-05-22 NOTE — TELEPHONE ENCOUNTER
Jessica,    This patient is followed by Dr. Ortiz. Can you please enter the order for this PM gen change?    Thank you,  Leanna

## 2023-05-23 ENCOUNTER — HOSPITAL ENCOUNTER (OUTPATIENT)
Facility: MEDICAL CENTER | Age: 73
End: 2023-05-23
Attending: INTERNAL MEDICINE | Admitting: INTERNAL MEDICINE
Payer: MEDICARE

## 2023-05-23 NOTE — TELEPHONE ENCOUNTER
Patient scheduled for PM gen change on 8-1-23 with Dr. Simmons. Patient has been instructed to check in at 12:00 for 2:00 case time. Message sent to authormarcus Myers with [employer] notified.

## 2023-05-24 ENCOUNTER — APPOINTMENT (OUTPATIENT)
Dept: ADMISSIONS | Facility: MEDICAL CENTER | Age: 73
End: 2023-05-24
Attending: INTERNAL MEDICINE
Payer: MEDICARE

## 2023-06-19 ENCOUNTER — PRE-ADMISSION TESTING (OUTPATIENT)
Dept: ADMISSIONS | Facility: MEDICAL CENTER | Age: 73
End: 2023-06-19
Attending: INTERNAL MEDICINE
Payer: MEDICARE

## 2023-06-19 VITALS — WEIGHT: 212 LBS | HEIGHT: 69 IN | BODY MASS INDEX: 31.4 KG/M2

## 2023-07-24 NOTE — TELEPHONE ENCOUNTER
Device Team - can you please transfer his remote checks to Dr. Otto's office, per patient's request. That office will be following his device moving forward.

## 2023-07-24 NOTE — TELEPHONE ENCOUNTER
Recvd call from patient - he is transferring care to Dr. Otto's office He will be scheduling this procedure with them. Cancelled case with Margarita in cath lab scheduling and Lucia with [facility].

## 2023-07-25 NOTE — TELEPHONE ENCOUNTER
Spoke with patient--advised patient remote monitoring has been released--patient advised to contact Dr. Otto's office to advise they are able to enroll on their site at this time.  Verbalized understanding.

## 2023-08-01 ENCOUNTER — APPOINTMENT (OUTPATIENT)
Dept: CARDIOLOGY | Facility: MEDICAL CENTER | Age: 73
End: 2023-08-01
Attending: INTERNAL MEDICINE
Payer: MEDICARE

## 2023-08-17 ENCOUNTER — APPOINTMENT (OUTPATIENT)
Dept: CARDIOLOGY | Facility: MEDICAL CENTER | Age: 73
End: 2023-08-17
Payer: MEDICARE